# Patient Record
Sex: FEMALE | NOT HISPANIC OR LATINO | Employment: OTHER | URBAN - NONMETROPOLITAN AREA
[De-identification: names, ages, dates, MRNs, and addresses within clinical notes are randomized per-mention and may not be internally consistent; named-entity substitution may affect disease eponyms.]

---

## 2017-10-04 ENCOUNTER — OFFICE VISIT - GICH (OUTPATIENT)
Dept: INTERNAL MEDICINE | Facility: OTHER | Age: 77
End: 2017-10-04

## 2017-10-04 ENCOUNTER — HISTORY (OUTPATIENT)
Dept: INTERNAL MEDICINE | Facility: OTHER | Age: 77
End: 2017-10-04

## 2017-10-04 DIAGNOSIS — L65.9 NONSCARRING HAIR LOSS: ICD-10-CM

## 2017-10-04 DIAGNOSIS — Z23 ENCOUNTER FOR IMMUNIZATION: ICD-10-CM

## 2017-10-04 DIAGNOSIS — M85.80 OTHER SPECIFIED DISORDERS OF BONE DENSITY AND STRUCTURE, UNSPECIFIED SITE (CODE): ICD-10-CM

## 2017-10-04 DIAGNOSIS — E78.5 HYPERLIPIDEMIA: ICD-10-CM

## 2017-10-04 LAB
CHOL/HDL RATIO - HISTORICAL: 3.12
CHOLESTEROL TOTAL: 187 MG/DL
HDLC SERPL-MCNC: 60 MG/DL (ref 23–92)
LDLC SERPL CALC-MCNC: 109 MG/DL
NON-HDL CHOLESTEROL - HISTORICAL: 127 MG/DL
PROVIDER ORDERDED STATUS - HISTORICAL: ABNORMAL
TRIGL SERPL-MCNC: 90 MG/DL
TSH - HISTORICAL: 2.51 UIU/ML (ref 0.34–5.6)

## 2017-10-04 ASSESSMENT — ANXIETY QUESTIONNAIRES
2. NOT BEING ABLE TO STOP OR CONTROL WORRYING: NOT AT ALL
3. WORRYING TOO MUCH ABOUT DIFFERENT THINGS: NOT AT ALL
5. BEING SO RESTLESS THAT IT IS HARD TO SIT STILL: NOT AT ALL
4. TROUBLE RELAXING: NOT AT ALL
GAD7 TOTAL SCORE: 0
7. FEELING AFRAID AS IF SOMETHING AWFUL MIGHT HAPPEN: NOT AT ALL
6. BECOMING EASILY ANNOYED OR IRRITABLE: NOT AT ALL
1. FEELING NERVOUS, ANXIOUS, OR ON EDGE: NOT AT ALL

## 2017-10-04 ASSESSMENT — PATIENT HEALTH QUESTIONNAIRE - PHQ9: SUM OF ALL RESPONSES TO PHQ QUESTIONS 1-9: 0

## 2017-10-05 ENCOUNTER — AMBULATORY - GICH (OUTPATIENT)
Dept: INTERNAL MEDICINE | Facility: OTHER | Age: 77
End: 2017-10-05

## 2017-11-21 ENCOUNTER — COMMUNICATION - GICH (OUTPATIENT)
Dept: INTERNAL MEDICINE | Facility: OTHER | Age: 77
End: 2017-11-21

## 2017-12-18 ENCOUNTER — AMBULATORY - GICH (OUTPATIENT)
Dept: FAMILY MEDICINE | Facility: OTHER | Age: 77
End: 2017-12-18

## 2017-12-28 NOTE — PATIENT INSTRUCTIONS
Patient Information     Patient Name MRN Kiesha Kaye [id number] Female 1940      Patient Instructions by Riri Messer DO at 10/4/2017  9:00 AM     Author:  Riri Messer DO Service:  (none) Author Type:  PHYS- Osteopathic     Filed:  10/4/2017  9:37 AM Encounter Date:  10/4/2017 Status:  Signed     :  Riri Messer DO (PHYS- Osteopathic)            Recommend taking Vitamin D 1000 IU daily.    Also, Calcium 1200-1500mg daily from supplement and diet combined.  Be sure to take into account the amount of calcium you may be getting from food daily so as not to get too much calcium.    Recommend daily exercise with a focus on low-weight strengthening.  Yoga is a great way to do this.    Please let me know if you have any questions regarding this.       Index South Korean Related topics   Calcium in the Diet   ________________________________________________________________________  KEY POINTS    Calcium is a mineral that is important for your heart, bones, teeth, and muscles to stay healthy.    Eating or drinking certain things can cause you to lose calcium.    You can get calcium from dairy products, calcium-fortified foods, or from supplements. If you can get enough calcium in your diet, you do not need to take calcium supplements.  ________________________________________________________________________  What is calcium?   Calcium is a mineral that is very important for:    Heart health    Bone health    Teeth    Nerves    Muscles    Blood clotting    How much calcium do I need?   Many food products list the amount of calcium per serving on the label. Food labels list calcium as a % of the Daily Value (DV) based on 1,000 mg of calcium per day. Look for foods that provide 10% or more of the daily value for calcium.   The total amount of calcium you need, preferably from dairy foods, depends on your age and gender:      Group            Calcium/Day  --------------------------------------  Adults 19 to 50  1000 mg  Women 51 to 70   1200 mg  Men 51 to 70     1000 mg  Adults over 70   1200 mg  --------------------------------------  * mg = milligrams    What keeps me from getting enough calcium?   Here are some things that can make it harder for your body to get enough calcium:    Not getting enough vitamin D. Vitamin D increases the amount of calcium absorbed by your body. It s important to get enough sunlight to help your body make vitamin D and to choose foods that contain vitamin D. Milk contains vitamin D and some brands of cheese, yogurt, juice, and margarine have added vitamin D. Check labels for the amount of vitamin D per serving. Fish such as salmon, mackerel, and tuna are good natural sources of vitamin D. If your provider recommends that you take a calcium supplement, there are some that include vitamin D.    Too much fiber in the diet. This is more of a concern if you have low amounts of calcium in your diet. Take calcium supplements or eat calcium-fortified foods 2 hours before or after eating 100% bran products. Soaking beans in water and discarding the liquid before cooking can also help.    Soft drinks, energy drinks, tea, and coffee. People who drink these products instead of milk often don't get enough calcium.    Taking some medicines. Medicines such as some antibiotics, heartburn medicines that decrease stomach acid production, and antacids that contain aluminum can make it harder for your body to absorb calcium.  These things can cause you to lose calcium:    Eating a lot of protein foods, such as meats, poultry, and eggs. The more protein you eat, the more calcium you lose. As long as your diet is balanced and contains enough calcium, this should not be a problem.    Eating a lot of salt. The more salt in your diet, the more calcium you lose. Limit the salt in your diet. Cutting back on salt and getting enough calcium can help  lower blood pressure and help prevent fluid retention.  Milk products are one of the best sources of calcium. Calcium is also in many other foods such as vegetables, beans, nuts, seeds, and soy. However, the calcium in these foods is not absorbed as well as the calcium in milk products. Calcium has been added to some foods (fortified), which makes it easier to meet daily calcium needs, but it still can be hard for your body to get enough calcium if dairy foods are not a part of your diet.     Do I need a calcium supplement?   If you can get enough calcium in your diet, you do not need to take calcium supplements. People who get too much calcium have a higher risk for kidney stones and stroke. Men who take calcium supplements are at higher risk for a heart attack. Ask your healthcare provider if you should take calcium supplements, and which kind you should take.   Calcium supplements of 1000 mg or less per day do not prevent fractures in postmenopausal women. However, there may be some benefit from higher doses of calcium supplements. If you are a postmenopausal woman and you have never had a fracture, ask your healthcare provider if you should take calcium supplements.  You may need a supplement if you:    Have digestive problems that prevent you from absorbing calcium.    Avoid dairy products due to allergic or other reactions (such as lactose intolerance or milk allergy).    Don't eat any animal products.    Do not get enough calcium in your diet.    Are pregnant or breast-feeding.    Have osteoporosis or osteopenia (weakened bones).    Have a vitamin D deficiency.  There are many kinds of calcium supplements. The most common are calcium carbonate and calcium citrate.     Calcium carbonate is best absorbed with a meal.    Calcium citrate can be taken on a full or empty stomach. Calcium citrate may be a better choice for older adults or younger people who have low levels of stomach acid.    Calcium phosphate,  lactate, and gluconate are also well absorbed. However, the amount of calcium per pill is lower, so you may need to take many pills a day to meet your needs.  Your body absorbs calcium best if you take no more than 500 mg at a time, and take it two or more times per day as recommended by your healthcare provider. Look for calcium supplements that have the USP or Consumer Lab symbol on the label. Products with these labels have been tested to make sure they are absorbed by the body.    How can I eat the right amount of calcium?  Eat more calcium-rich foods. Here are some ideas for adding calcium to your diet.    Have low-fat or nonfat milk, cottage cheese with fruit, or yogurt for snacks.    Eat calcium-fortified breakfast cereals with rice, almond, or soy milk, or have calcium-fortified waffles or pancakes.    Cook hot cereals with milk instead of water.    Serve yogurt or milk smoothies instead of juice.    Add yogurt to lunches or use a dip made with yogurt when having a fruit snack.    Add lean shredded cheese to baked potatoes, vegetables, soups, and salads.    Use milk when making cream soups instead of water.    Serve flavored milk or hot chocolate for an evening treat.    Use Parmesan cheese topping for Italian dishes. A 2 tablespoon serving adds about 140 mg of calcium.    Serve a healthy vegetable pizza made with low-fat cheese.    Serve lean mozzarella string cheese with crackers and fruit for a snack.    Make puddings with milk.    Get plenty of exercise. Walk a mile a day if you can and do strength training exercise a few times a week. Exercise helps your body to use calcium to strengthen your bones.  Some people cannot digest milk products because their bodies lack the enzyme needed to break down milk sugar. This problem is called lactose intolerance. If you have this problem, you can buy products such as Lactaid or Dairy Ease. These products contain lactase, which can help you digest milk  products.    For more information contact:    The Academy of Nutrition and Dietetics  284.991.9955  http://www.eatright.org  Developed by [employer].  Adult Advisor 2016.3 published by [employer].  Last modified: 2015-04-17  Last reviewed: 2015-03-25  This content is reviewed periodically and is subject to change as new health information becomes available. The information is intended to inform and educate and is not a replacement for medical evaluation, advice, diagnosis or treatment by a healthcare professional.  References   Adult Advisor 2016.3 Index    Copyright   2016 [employer], a division of [employer] All rights reserved.

## 2017-12-30 NOTE — NURSING NOTE
Patient Information     Patient Name MRN Kiesha Kaye [id number] Female 1940      Nursing Note by Amrita Canseco at 10/4/2017  9:00 AM     Author:  Amrita Canseco Service:  (none) Author Type:  (none)     Filed:  10/4/2017  9:17 AM Encounter Date:  10/4/2017 Status:  Signed     :  Amrita Canseco            Patient here for annual exam, needs lab work, declined flu vaccine today. Last eye exam  and last dental exam . Amrita Canseco LPN .......................10/4/2017  9:10 AM

## 2018-01-25 ENCOUNTER — DOCUMENTATION ONLY (OUTPATIENT)
Dept: FAMILY MEDICINE | Facility: OTHER | Age: 78
End: 2018-01-25

## 2018-01-25 PROBLEM — J30.2 SEASONAL ALLERGIES: Status: ACTIVE | Noted: 2018-01-25

## 2018-01-25 RX ORDER — DIPHENHYDRAMINE HYDROCHLORIDE 25 MG/1
5 TABLET ORAL DAILY
COMMUNITY
Start: 2015-10-23

## 2018-01-25 RX ORDER — DIPHENOXYLATE HYDROCHLORIDE AND ATROPINE SULFATE 2.5; .025 MG/1; MG/1
1 TABLET ORAL DAILY
COMMUNITY

## 2018-01-26 VITALS
HEART RATE: 68 BPM | WEIGHT: 119.8 LBS | DIASTOLIC BLOOD PRESSURE: 64 MMHG | HEIGHT: 62 IN | SYSTOLIC BLOOD PRESSURE: 124 MMHG | BODY MASS INDEX: 22.05 KG/M2

## 2018-02-01 ASSESSMENT — ANXIETY QUESTIONNAIRES: GAD7 TOTAL SCORE: 0

## 2018-02-01 ASSESSMENT — PATIENT HEALTH QUESTIONNAIRE - PHQ9: SUM OF ALL RESPONSES TO PHQ QUESTIONS 1-9: 0

## 2018-06-26 ENCOUNTER — HOSPITAL ENCOUNTER (EMERGENCY)
Facility: OTHER | Age: 78
Discharge: HOME | End: 2018-06-26
Attending: EMERGENCY MEDICINE | Admitting: EMERGENCY MEDICINE
Payer: MEDICARE

## 2018-06-26 VITALS
TEMPERATURE: 97.6 F | WEIGHT: 119 LBS | DIASTOLIC BLOOD PRESSURE: 88 MMHG | SYSTOLIC BLOOD PRESSURE: 165 MMHG | BODY MASS INDEX: 20.32 KG/M2 | HEART RATE: 75 BPM | RESPIRATION RATE: 16 BRPM | HEIGHT: 64 IN | OXYGEN SATURATION: 96 %

## 2018-06-26 DIAGNOSIS — R44.0 AUDITORY HALLUCINATIONS: ICD-10-CM

## 2018-06-26 LAB

## 2018-06-26 PROCEDURE — 81001 URINALYSIS AUTO W/SCOPE: CPT | Performed by: EMERGENCY MEDICINE

## 2018-06-26 PROCEDURE — 99282 EMERGENCY DEPT VISIT SF MDM: CPT | Mod: Z6 | Performed by: EMERGENCY MEDICINE

## 2018-06-26 PROCEDURE — 99283 EMERGENCY DEPT VISIT LOW MDM: CPT | Performed by: EMERGENCY MEDICINE

## 2018-06-26 ASSESSMENT — ENCOUNTER SYMPTOMS
HALLUCINATIONS: 1
FATIGUE: 0
WEAKNESS: 0
DIZZINESS: 0
LIGHT-HEADEDNESS: 0
ABDOMINAL PAIN: 0
CHILLS: 0
HEADACHES: 0
SHORTNESS OF BREATH: 0
FEVER: 0
PALPITATIONS: 0

## 2018-06-26 NOTE — ED AVS SNAPSHOT
[facility]    [address]    [place] [address]    Phone:  [phone]    Fax:  [phone]                                       Kiesha Garner   MRN: [id number]    Department:  [facility]   Date of Visit:  6/26/2018           After Visit Summary Signature Page     I have received my discharge instructions, and my questions have been answered. I have discussed any challenges I see with this plan with the nurse or doctor.    ..........................................................................................................................................  Patient/Patient Representative Signature      ..........................................................................................................................................  Patient Representative Print Name and Relationship to Patient    ..................................................               ................................................  Date                                            Time    ..........................................................................................................................................  Reviewed by Signature/Title    ...................................................              ..............................................  Date                                                            Time

## 2018-06-26 NOTE — ED AVS SNAPSHOT
[facility]    [address]    [place] [address]    Phone:  [phone]    Fax:  [phone]                                       Kiesha Garner   MRN: [id number]    Department:  [facility]   Date of Visit:  6/26/2018           Patient Information     Date Of Birth          1940        Your diagnoses for this visit were:     Auditory hallucinations        You were seen by Uriah Harper MD.        Discharge Instructions       1.  Low up with your primary care physician tomorrow for further evaluation and recommendations  2.  Return to ER as needed    24 Hour Appointment Hotline     To schedule an appointment at [facility], please call [phone]. If you don't have a family doctor or clinic, we will help you find one. [facility] are conveniently located to serve the needs of you and your family.           Review of your medicines      Our records show that you are taking the medicines listed below. If these are incorrect, please call your family doctor or clinic.        Dose / Directions Last dose taken    BIOTIN 5000 5 MG Caps   Dose:  5 mg   Generic drug:  biotin        Take 5 mg by mouth daily   Refills:  0        calcium 500/D 500-200 MG-UNIT per tablet   Dose:  1-2 tablet   Generic drug:  calcium carb 1250 mg (500 mg [place])/vitamin D 200 unit        Take 1-2 tablets by mouth daily   Refills:  0        MULTI-VITAMINS Tabs   Dose:  1 tablet        Take 1 tablet by mouth daily   Refills:  0        PSYLLIUM PO   Dose:  1 tsp.        Take 1 tsp. by mouth daily   Refills:  0                Procedures and tests performed during your visit     *UA reflex to Microscopic    Urine Microscopic      Orders Needing Specimen Collection     None      Pending Results     No orders found from 6/24/2018 to 6/27/2018.            Pending Culture Results     No orders found from 6/24/2018 to 6/27/2018.            Pending Results Instructions     If you  had any lab results that were not finalized at the time of your Discharge, you can call the ED Lab Result RN at [phone]. You will be contacted by this team for any positive Lab results or changes in treatment. The nurses are available 7 days a week from 10A to 6:30P.  You can leave a message 24 hours per day and they will return your call.        Thank you for choosing [facility]       Thank you for choosing [facility] for your care. Our goal is always to provide you with excellent care. Hearing back from our patients is one way we can continue to improve our services. Please take a few minutes to complete the written survey that you may receive in the mail after you visit with us. Thank you!        [employer] Information     [employer] gives you secure access to your electronic health record. If you see a primary care provider, you can also send messages to your care team and make appointments. If you have questions, please call your primary care clinic.  If you do not have a primary care provider, please call [phone] and they will assist you.        Care EveryWhere ID     This is your Care EveryWhere ID. This could be used by other organizations to access your [facility] medical records  [id number]        Equal Access to Services     CHAPITO DA SILVA : Hadii xiomara Galeana, zulma archer, thom carpio, keri toussaint. So [facility] [phone].    ATENCIÓN: Si habla español, tiene a kruger disposición servicios gratuitos de asistencia lingüística. Llame al [phone].    We comply with applicable federal civil rights laws and [place] laws. We do not discriminate on the basis of race, color, national origin, age, disability, sex, sexual orientation, or gender identity.            After Visit Summary       This is your record. Keep this with you and show to your community pharmacist(s) and doctor(s) at your next visit.

## 2018-06-27 ENCOUNTER — TELEPHONE (OUTPATIENT)
Dept: INTERNAL MEDICINE | Facility: OTHER | Age: 78
End: 2018-06-27

## 2018-06-27 NOTE — TELEPHONE ENCOUNTER
I am okay with seeing her tomorrow during Neptali's appointment especially if his blood pressure is controlled.  We can reschedule him in the next week or 2 if needed.

## 2018-06-27 NOTE — TELEPHONE ENCOUNTER
Patient's  Neptali states his blood pressure has been good.  He will bring in past readings tomorrow.  Patient will be seen at 10:20 tomorrow, Thursday 06/28/18 for ER follow up.    Shea Marroquin LPN............6/27/2018 1:46 PM

## 2018-06-27 NOTE — ED PROVIDER NOTES
"  History   No chief complaint on file.    HPI Comments: This is a 78-year-old female who denies any significant past medical history or psychiatric history coming into the emergency room with complaint of hearing voices for about a week now.  Patient states she has been hearing voices regarding her son in distress.  She denies ever having had the same experience in the past or being treated for any psychiatric history in the past.  She admits that she is under lots of stress as her 's both is not well and she also had a friend who recently passed away.  She discuss this with her son and was told to come into the emergency room \"to rule out stroke.\"  Patient denies visual changes, headache, focal weakness, or voice changes or slurring of the speech.  She denies prior history of stroke or TIA.       Problem List:    Patient Active Problem List    Diagnosis Date Noted     Seasonal allergies 01/25/2018     Priority: Medium     Health care maintenance 10/24/2015     Priority: Medium     Overview:   Colonoscopy: Done in 2006 and normal, repeat scheduled  Breast Exam/Mammography: Does not complete self exams; last mammogram 2014 and normal, repeat every 2 years (this year)  Pap smear: Never any abnormal, no longer performing due to hysterectomy, age and patient request unless symptomatic  DEXA: Done in 2015 and T score - 1.2; repeat 5 years and as needed to manage your bone density problem  Immunizations: Due for influenza now, pneumovax in Oct, declined influenza.  Appt for pneumovax. UTD on other vaccines.   Lipids/Annual Exam: Done a year ago and elevated, repeat this fall  Hepatitis C screen: not indicated       ACP (advance care planning) 01/02/2014     Priority: Medium     Hyperlipidemia 10/24/2012     Priority: Medium     Overview:   diet controlled       Osteopenia 04/10/2012     Priority: Medium     Overview:   she reports prior dexa that showed low density          Past Medical History:    Past Medical " History:   Diagnosis Date     Cataract      Duodenal ulcer without hemorrhage or perforation      Hyperlipidemia      Other seasonal allergic rhinitis      Other specified disorders of bone density and structure, unspecified site (CODE)      Palpitations      Personal history of other specified conditions (CODE)        Past Surgical History:    Past Surgical History:   Procedure Laterality Date     COLONOSCOPY      9/30/2016,normal; repeat 10 years if indicated     EXTRACAPSULAR CATARACT EXTRATION WITH INTRAOCULAR LENS IMPLANT      No Comments Provided     HYSTERECTOMY TOTAL ABDOMINAL      1984,Dr. Donnelly; for fibroids       Family History:    Family History   Problem Relation Age of Onset     Other - See Comments Father      Pneumonia and alcohol related illness     Other - See Comments Mother      Memory loss     Other - See Comments Sister      Takatsubo cardiomyopathy     Dementia Sister      Dementia     Colon Cancer Paternal Aunt      Cancer-colon     Other - See Comments Son      No Known Problems     Genetic Disorder Other      Genetic,No Fhx of DM, CAD     Breast Cancer No family hx of      Cancer-breast       Social History:  Marital Status:   [2]  Social History   Substance Use Topics     Smoking status: Passive Smoke Exposure - Never Smoker     Smokeless tobacco: Never Used     Alcohol use 0.0 oz/week      Comment: Alcoholic Drinks/day: socially; likes red wine        Medications:      biotin (BIOTIN 5000) 5 MG CAPS   calcium carb 1250 mg, 500 mg [place],/vitamin D 200 unit (CALCIUM 500/D) 500-200 MG-UNIT per tablet   Multiple Vitamin (MULTI-VITAMINS) TABS   PSYLLIUM PO         Review of Systems   Constitutional: Negative for chills, fatigue and fever.   Respiratory: Negative for shortness of breath.    Cardiovascular: Negative for chest pain and palpitations.   Gastrointestinal: Negative for abdominal pain.   Neurological: Negative for dizziness, weakness, light-headedness and headaches.  "  Psychiatric/Behavioral: Positive for hallucinations.   All other systems reviewed and are negative.      Physical Exam   BP: 165/88  Pulse: 75  Temp: 97.6  F (36.4  C)  Resp: 16  Height: 162.6 cm (5' 4\")  Weight: 54 kg (119 lb)  SpO2: 96 %      Physical Exam   Constitutional: She is oriented to person, place, and time. She appears well-developed and well-nourished. No distress.   HENT:   Head: Normocephalic and atraumatic.   Mouth/Throat: Oropharynx is clear and moist. No oropharyngeal exudate.   Eyes: Conjunctivae and EOM are normal. Pupils are equal, round, and reactive to light.   Vision grossly intact.   Neck: Normal range of motion.   Cardiovascular: Normal rate, regular rhythm, normal heart sounds and intact distal pulses.    Pulmonary/Chest: Effort normal and breath sounds normal. No respiratory distress. She has no wheezes. She has no rales. She exhibits no tenderness.   Abdominal: Soft. Bowel sounds are normal. She exhibits no distension. There is no tenderness. There is no rebound and no guarding.   Musculoskeletal: Normal range of motion. She exhibits no edema or tenderness.   Neurological: She is oriented to person, place, and time.   No focal neurologic findings on exam and   Skin: No rash noted. No erythema. No pallor.   Psychiatric: She has a normal mood and affect. Her speech is normal and behavior is normal. Judgment and thought content normal. Cognition and memory are normal.       ED Course     Patient is having fixated auditory hallucinations whereby she is hearing voices regarding her son who is stress for about a week.  She denies any psychiatric history in the past.  However she says she has been under stress lately regarding her 's health and having a friend who recently passed away.  She does not appear to be, however, distressed during examination.  Patient has no focal neurologic findings or visual disturbances.  UA obtained per ED nurse discretion is completely unremarkable.  " Patient is reassured hearing voices is not signs or symptom of stroke.  On examination or per history she has no signs or symptoms of stroke.  Patient was apparently told by her son who spoke with her chiropractor that patient should come to the emergency room and get blood work and and get stroke ruled out.  I told the patient based on her examination and history I do not believe that she has to worry about stroke for this.  She is advised to consult with her primary care physician tomorrow regarding these recurrent auditory hallucinations.  She said this is what she was planning to do anyway.     ED Course     Procedures               Critical Care time:  none               Results for orders placed or performed during the hospital encounter of 06/26/18 (from the past 24 hour(s))   *UA reflex to Microscopic   Result Value Ref Range    Color Urine Yellow     Appearance Urine Clear     Glucose Urine Negative NEG^Negative mg/dL    Bilirubin Urine Negative NEG^Negative    Ketones Urine Negative NEG^Negative mg/dL    Specific Gravity Urine 1.015 1.003 - 1.035    Blood Urine Negative NEG^Negative    pH Urine 6.0 5.0 - 7.0 pH    Protein Albumin Urine Negative NEG^Negative mg/dL    Urobilinogen Urine 0.2 0.2 - 1.0 EU/dL    Nitrite Urine Negative NEG^Negative    Leukocyte Esterase Urine Small (A) NEG^Negative    Source Midstream Urine    Urine Microscopic   Result Value Ref Range    WBC Urine 0 - 5 OTO5^0 - 5 /HPF    RBC Urine O - 2 OTO2^O - 2 /HPF    Squamous Epithelial /LPF Urine Few FEW^Few /LPF    Bacteria Urine Few (A) NEG^Negative /HPF       Medications - No data to display    Assessments & Plan (with Medical Decision Making)     I have reviewed the nursing notes.    I have reviewed the findings, diagnosis, plan and need for follow up with the patient.       Discharge Medication List as of 6/26/2018 10:12 PM          Final diagnoses:   Auditory hallucinations       6/26/2018   [facility]      Uriah Harper MD  06/26/18 [id number]

## 2018-06-27 NOTE — TELEPHONE ENCOUNTER
Grand daughter Pallavi states patient is hallucinating hearing Neptali's voice saying things.  Police were called yesterday.   This has been going on for the past week.  Inquiring about changing Neptali Garner appointment tomorrow for Kiesha to be seen at that time.  Friday at 11:40 for ER follow up will work for family but they are concerned.  Please advise.    Shea Marroquin LPN............6/27/2018 11:14 AM

## 2018-06-27 NOTE — DISCHARGE INSTRUCTIONS
1.  Low up with your primary care physician tomorrow for further evaluation and recommendations  2.  Return to ER as needed

## 2018-06-27 NOTE — ED TRIAGE NOTES
"Pt comes in with Granddaughter. Pt has been having intermittent voices in her head for about a week. Pt reports it is her son's voice, the voices aren't telling her to do anything, they are not malicious. Pt's son is alive. Pt reports \"humming\" in her ear. Pt has no chronic diseases and her medications are vitamins. Pt denies taking any other medications. Pt is not suicidal. Family thinks she needs to have some blood work and a UA.    Cynthia Allen RN on 6/26/2018 at 9:22 PM    "

## 2018-06-28 ENCOUNTER — OFFICE VISIT (OUTPATIENT)
Dept: INTERNAL MEDICINE | Facility: OTHER | Age: 78
End: 2018-06-28
Attending: INTERNAL MEDICINE
Payer: MEDICARE

## 2018-06-28 VITALS
HEIGHT: 64 IN | SYSTOLIC BLOOD PRESSURE: 136 MMHG | RESPIRATION RATE: 18 BRPM | TEMPERATURE: 97 F | WEIGHT: 120.56 LBS | BODY MASS INDEX: 20.58 KG/M2 | HEART RATE: 72 BPM | DIASTOLIC BLOOD PRESSURE: 82 MMHG

## 2018-06-28 DIAGNOSIS — E78.5 HYPERLIPIDEMIA, UNSPECIFIED HYPERLIPIDEMIA TYPE: ICD-10-CM

## 2018-06-28 DIAGNOSIS — E87.1 HYPONATREMIA: ICD-10-CM

## 2018-06-28 DIAGNOSIS — R44.0 AUDITORY HALLUCINATIONS: Primary | ICD-10-CM

## 2018-06-28 DIAGNOSIS — R42 DIZZINESS: ICD-10-CM

## 2018-06-28 LAB
ALBUMIN SERPL-MCNC: 4.8 G/DL (ref 3.5–5.7)
ALP SERPL-CCNC: 44 U/L (ref 34–104)
ALT SERPL W P-5'-P-CCNC: 18 U/L (ref 7–52)
ANION GAP SERPL CALCULATED.3IONS-SCNC: 6 MMOL/L (ref 3–14)
AST SERPL W P-5'-P-CCNC: 21 U/L (ref 13–39)
BASOPHILS # BLD AUTO: 0 10E9/L (ref 0–0.2)
BASOPHILS NFR BLD AUTO: 0.4 %
BILIRUB SERPL-MCNC: 0.8 MG/DL (ref 0.3–1)
BUN SERPL-MCNC: 9 MG/DL (ref 7–25)
CALCIUM SERPL-MCNC: 9.8 MG/DL (ref 8.6–10.3)
CHLORIDE SERPL-SCNC: 95 MMOL/L (ref 98–107)
CHOLEST SERPL-MCNC: 183 MG/DL
CO2 SERPL-SCNC: 29 MMOL/L (ref 21–31)
CREAT SERPL-MCNC: 0.69 MG/DL (ref 0.6–1.2)
CRP SERPL-MCNC: 0 MG/L
DIFFERENTIAL METHOD BLD: NORMAL
EOSINOPHIL # BLD AUTO: 0.1 10E9/L (ref 0–0.7)
EOSINOPHIL NFR BLD AUTO: 1.5 %
ERYTHROCYTE [DISTWIDTH] IN BLOOD BY AUTOMATED COUNT: 13.5 % (ref 10–15)
ERYTHROCYTE [SEDIMENTATION RATE] IN BLOOD BY WESTERGREN METHOD: 2 MM/H (ref 1–15)
GFR SERPL CREATININE-BSD FRML MDRD: 82 ML/MIN/1.7M2
GLUCOSE SERPL-MCNC: 98 MG/DL (ref 70–105)
HCT VFR BLD AUTO: 42.2 % (ref 35–47)
HDLC SERPL-MCNC: 68 MG/DL (ref 23–92)
HGB BLD-MCNC: 13.8 G/DL (ref 11.7–15.7)
IMM GRANULOCYTES # BLD: 0 10E9/L (ref 0–0.4)
IMM GRANULOCYTES NFR BLD: 0.6 %
LDLC SERPL CALC-MCNC: 102 MG/DL
LYMPHOCYTES # BLD AUTO: 1.8 10E9/L (ref 0.8–5.3)
LYMPHOCYTES NFR BLD AUTO: 25.8 %
MCH RBC QN AUTO: 28 PG (ref 26.5–33)
MCHC RBC AUTO-ENTMCNC: 32.7 G/DL (ref 31.5–36.5)
MCV RBC AUTO: 86 FL (ref 78–100)
MONOCYTES # BLD AUTO: 0.6 10E9/L (ref 0–1.3)
MONOCYTES NFR BLD AUTO: 8 %
NEUTROPHILS # BLD AUTO: 4.4 10E9/L (ref 1.6–8.3)
NEUTROPHILS NFR BLD AUTO: 63.7 %
NONHDLC SERPL-MCNC: 115 MG/DL
PLATELET # BLD AUTO: 251 10E9/L (ref 150–450)
POTASSIUM SERPL-SCNC: 4.8 MMOL/L (ref 3.5–5.1)
PROT SERPL-MCNC: 7.1 G/DL (ref 6.4–8.9)
RBC # BLD AUTO: 4.93 10E12/L (ref 3.8–5.2)
SODIUM SERPL-SCNC: 130 MMOL/L (ref 134–144)
TRIGL SERPL-MCNC: 66 MG/DL
TSH SERPL DL<=0.05 MIU/L-ACNC: 2.13 IU/ML (ref 0.34–5.6)
WBC # BLD AUTO: 6.9 10E9/L (ref 4–11)

## 2018-06-28 PROCEDURE — G0463 HOSPITAL OUTPT CLINIC VISIT: HCPCS | Performed by: INTERNAL MEDICINE

## 2018-06-28 PROCEDURE — 85025 COMPLETE CBC W/AUTO DIFF WBC: CPT | Performed by: INTERNAL MEDICINE

## 2018-06-28 PROCEDURE — 86038 ANTINUCLEAR ANTIBODIES: CPT | Performed by: INTERNAL MEDICINE

## 2018-06-28 PROCEDURE — 85652 RBC SED RATE AUTOMATED: CPT | Performed by: INTERNAL MEDICINE

## 2018-06-28 PROCEDURE — 86140 C-REACTIVE PROTEIN: CPT | Performed by: INTERNAL MEDICINE

## 2018-06-28 PROCEDURE — 80061 LIPID PANEL: CPT | Performed by: INTERNAL MEDICINE

## 2018-06-28 PROCEDURE — 80053 COMPREHEN METABOLIC PANEL: CPT | Performed by: INTERNAL MEDICINE

## 2018-06-28 PROCEDURE — 86039 ANTINUCLEAR ANTIBODIES (ANA): CPT | Performed by: INTERNAL MEDICINE

## 2018-06-28 PROCEDURE — 86618 LYME DISEASE ANTIBODY: CPT | Performed by: INTERNAL MEDICINE

## 2018-06-28 PROCEDURE — 84443 ASSAY THYROID STIM HORMONE: CPT | Performed by: INTERNAL MEDICINE

## 2018-06-28 PROCEDURE — 99214 OFFICE O/P EST MOD 30 MIN: CPT | Performed by: INTERNAL MEDICINE

## 2018-06-28 PROCEDURE — 36415 COLL VENOUS BLD VENIPUNCTURE: CPT | Performed by: INTERNAL MEDICINE

## 2018-06-28 ASSESSMENT — PAIN SCALES - GENERAL: PAINLEVEL: NO PAIN (0)

## 2018-06-28 ASSESSMENT — ANXIETY QUESTIONNAIRES
7. FEELING AFRAID AS IF SOMETHING AWFUL MIGHT HAPPEN: NOT AT ALL
GAD7 TOTAL SCORE: 8
1. FEELING NERVOUS, ANXIOUS, OR ON EDGE: SEVERAL DAYS
5. BEING SO RESTLESS THAT IT IS HARD TO SIT STILL: NOT AT ALL
2. NOT BEING ABLE TO STOP OR CONTROL WORRYING: MORE THAN HALF THE DAYS
6. BECOMING EASILY ANNOYED OR IRRITABLE: NEARLY EVERY DAY
IF YOU CHECKED OFF ANY PROBLEMS ON THIS QUESTIONNAIRE, HOW DIFFICULT HAVE THESE PROBLEMS MADE IT FOR YOU TO DO YOUR WORK, TAKE CARE OF THINGS AT HOME, OR GET ALONG WITH OTHER PEOPLE: NOT DIFFICULT AT ALL
3. WORRYING TOO MUCH ABOUT DIFFERENT THINGS: SEVERAL DAYS

## 2018-06-28 ASSESSMENT — PATIENT HEALTH QUESTIONNAIRE - PHQ9: 5. POOR APPETITE OR OVEREATING: SEVERAL DAYS

## 2018-06-28 NOTE — MR AVS SNAPSHOT
After Visit Summary   6/28/2018    Kiesha Garner    MRN: [id number]           Patient Information     Date Of Birth          1940        Visit Information        Provider Department      6/28/2018 10:20 AM Riri Messer DO [facility]        Today's Diagnoses     Auditory hallucinations    -  1    Hyperlipidemia, unspecified hyperlipidemia type           Follow-ups after your visit        Follow-up notes from your care team     Return in about 1 week (around 7/5/2018) for Recheck, after testing.      Your next 10 appointments already scheduled     Jul 06, 2018  3:40 PM CDT   SHORT with Riri Messer DO   [facility] ([facility])    [address]  [place] [address] [phone]            Aug 27, 2018  9:00 AM CDT   PHYSICAL with Riri Messer DO   [facility] ([facility])    [address]  [place] [address]   [phone]              Future tests that were ordered for you today     Open Future Orders        Priority Expected Expires Ordered    MR Brain w/o & w Contrast Routine  6/28/2019 6/28/2018            Who to contact     If you have questions or need follow up information about today's clinic visit or your schedule please contact [facility] directly at [phone].  Normal or non-critical lab and imaging results will be communicated to you by MyChart, letter or phone within 4 business days after the clinic has received the results. If you do not hear from us within 7 days, please contact the clinic through MyChart or phone. If you have a critical or abnormal lab result, we will notify you by phone as soon as possible.  Submit refill requests through [employer] World or call your pharmacy and they will forward the refill request to us. Please allow 3 business days for your refill to be completed.          Additional Information About  "Your Visit        MyChart Information     [employer] gives you secure access to your electronic health record. If you see a primary care provider, you can also send messages to your care team and make appointments. If you have questions, please call your primary care clinic.  If you do not have a primary care provider, please call [phone] and they will assist you.        Care EveryWhere ID     This is your Care EveryWhere ID. This could be used by other organizations to access your [place] medical records  [id number]        Your Vitals Were     Pulse Temperature Respirations Height Breastfeeding? BMI (Body Mass Index)    72 97  F (36.1  C) (Tympanic) 18 1.626 m (5' 4\") No 20.69 kg/m2       Blood Pressure from Last 3 Encounters:   06/28/18 136/82   06/26/18 165/88   10/04/17 124/64    Weight from Last 3 Encounters:   06/28/18 54.7 kg (120 lb 9 oz)   06/26/18 54 kg (119 lb)   10/04/17 54.3 kg (119 lb 12.8 oz)              We Performed the Following     Anti Nuclear Amanda IgG by IFA with Reflex     CBC and Differential     Comprehensive metabolic panel     CRP inflammation     Lipid Panel     Lyme Disease Amanda with reflex to WB Serum     Sedimentation Rate (ESR)     TSH        Primary Care Provider Office Phone # Fax #    Riri MURRAY DO [facility] [phone] [address] [facility] [address]        Equal Access to Services     [facility] : Hadii aad ku hadasho Soomaali, waaxda luqadaha, qaybta kaalmada adeegyada, keri meredith . So [facility] [phone].    ATENCIÓN: Si habla español, tiene a kruger disposición servicios gratuitos de asistencia lingüística. Llame al [phone].    We comply with applicable federal civil rights laws and [place] laws. We do not discriminate on the basis of race, color, national origin, age, disability, sex, sexual orientation, or gender identity.            Thank you!     Thank you for choosing [facility] AND [facility]  for your " care. Our goal is always to provide you with excellent care. Hearing back from our patients is one way we can continue to improve our services. Please take a few minutes to complete the written survey that you may receive in the mail after your visit with us. Thank you!             Your Updated Medication List - Protect others around you: Learn how to safely use, store and throw away your medicines at www.disposemymeds.org.          This list is accurate as of 6/28/18 11:32 AM.  Always use your most recent med list.                   Brand Name Dispense Instructions for use Diagnosis    BIOTIN 5000 5 MG Caps   Generic drug:  biotin      Take 5 mg by mouth daily        calcium 500/D 500-200 MG-UNIT per tablet   Generic drug:  calcium carb 1250 mg (500 mg [place])/vitamin D 200 unit      Take 1-2 tablets by mouth daily        MULTI-VITAMINS Tabs      Take 1 tablet by mouth daily        PSYLLIUM PO      Take 1 tsp. by mouth daily

## 2018-06-28 NOTE — NURSING NOTE
Patient presents to clinic with her granddaughter Pallavi and  Neptali, for follow up after ER visit on 06/26/18 experiencing hallucinations.  She denies any pain and is lightheaded.    Shea Marroquin LPN............6/28/2018 10:34 AM

## 2018-06-28 NOTE — PROGRESS NOTES
"Chief Complaint   Patient presents with     Follow Up For     ER visit 06/26/18 hallucinations         HPI: Ms. Garner is a 78 year old female who presents today for follow up of recent ER visit.  She was seen in the ER on June 26, 2018.  At that time she was complaining of new onset auditory hallucinations.  She reports that this started approximately 2 weeks ago.  She started hearing voices in the tone of her son's voice.  It primarily occurs at night.  At times the voice is a conversational but at other times it is distressed.  He has not had any visual changes or visual hallucinations with this.  She has had some dizziness/lightheadedness off and on and also has noted a fine tremor in her right hand.  She denies any recent illness over the last 2 months.  She denies any sick contacts.  She has not had any recent travel.  She does not have any history of mental illness.  She has not had any headaches, muscle weakness, changes in sensation or coordination issues.  She reports some \"swishing\" in her ears more chronically over the last couple weeks.  She feels perhaps it is slightly worse on the left than the right.  She has never had anything like this prior.    She does have a history of hyperlipidemia.  She is due for cholesterol recheck.  She is not currently on any medications.  She is fasting today.    Her current medications include biotin, calcium and vitamin D, multivitamin and fiber.    She  reports that she is a non-smoker but has been exposed to tobacco smoke. She has never used smokeless tobacco.    Past medical history reviewed as below:     Past Medical History:   Diagnosis Date     Cataract      Duodenal ulcer without hemorrhage or perforation 1999    due to H pylori     Hyperlipidemia 10/24/2012    diet controlled     Osteopenia 04/10/2012     Other seasonal allergic rhinitis      Palpitations     only with increased alcohol   .      ROS  Pertinent ROS was performed and was negative, including for " "weight changes, night sweats, fever, chills, chest pain, shortness of breath, increased lower extremity edema, changes in bowel or bladder, blood in the stool, difficulty swallowing, sores in the mouth.  She has not had any difficulty walking.  No other concerns, with exception of HPI above.      EXAM:   /82 (BP Location: Right arm, Patient Position: Sitting, Cuff Size: Adult Regular)  Pulse 72  Temp 97  F (36.1  C) (Tympanic)  Resp 18  Ht 5' 4\" (1.626 m)  Wt 120 lb 9 oz (54.7 kg)  Breastfeeding? No  BMI 20.69 kg/m2    Estimated body mass index is 20.69 kg/(m^2) as calculated from the following:    Height as of this encounter: 5' 4\" (1.626 m).    Weight as of this encounter: 120 lb 9 oz (54.7 kg).      GEN: Vitals reviewed. Healthy appearing. Patient is in no acute distress. Cooperative with exam.  HEENT: Normocephalic atraumatic.  Pupils equally round.  No scleral icterus, no conjunctival erythema. Oropharynx with no erythema or exudates. Dentition adequate.  NECK: Supple; no thyromegaly.  No neck, cervical LAD.  Submandibular LAD not noted  CV: Heart regular in rate and rhythm with no murmur.    LUNGS: Lungs clear to auscultation bilaterally.  Chest rise equal bilaterally.  No accessory muscle use.  ABD:  nondistended  SKIN: Warm and dry to touch.  No rash on face, arms and legs.  EXT: No clubbing or cyanosis.  No peripheral edema.  PSYCH: Mood is good.  Affect appropriate. Speech fluent. Answers questions appropriately and thought process normal.  NEURO: Alert and oriented to person, place, and time.    Cranial nerves: Pupils equal, round, reactive to light and accomodation. Visual fields full. Extraocular muscles full. Facial sensation to light touch normal. Facial strength symmetric. Hearing normal. Tongue and palate midline. Power of tongue normal. Shoulder shrug normal and symmetric.  SCM muscle tone normal.   Motor: Tone and strength normal and symmetric in distal and proximal BUE and BLE. No " pronator drift or tremor noted today.   Sensory: Normal and symmetric to light touch  Cerebellar: Finger-nose-finger, rapid alternating movements of hands and heel-tap intact. Normal gait.     LABS: 6/28/2018 - Personally ordered/reviewed  Results for orders placed or performed in visit on 06/28/18   CBC and Differential   Result Value Ref Range    WBC 6.9 4.0 - 11.0 10e9/L    RBC Count 4.93 3.8 - 5.2 10e12/L    Hemoglobin 13.8 11.7 - 15.7 g/dL    Hematocrit 42.2 35.0 - 47.0 %    MCV 86 78 - 100 fl    MCH 28.0 26.5 - 33.0 pg    MCHC 32.7 31.5 - 36.5 g/dL    RDW 13.5 10.0 - 15.0 %    Platelet Count 251 150 - 450 10e9/L    Diff Method Automated Method     % Neutrophils 63.7 %    % Lymphocytes 25.8 %    % Monocytes 8.0 %    % Eosinophils 1.5 %    % Basophils 0.4 %    % Immature Granulocytes 0.6 %    Absolute Neutrophil 4.4 1.6 - 8.3 10e9/L    Absolute Lymphocytes 1.8 0.8 - 5.3 10e9/L    Absolute Monocytes 0.6 0.0 - 1.3 10e9/L    Absolute Eosinophils 0.1 0.0 - 0.7 10e9/L    Absolute Basophils 0.0 0.0 - 0.2 10e9/L    Abs Immature Granulocytes 0.0 0 - 0.4 10e9/L   Comprehensive metabolic panel   Result Value Ref Range    Sodium 130 (L) 134 - 144 mmol/L    Potassium 4.8 3.5 - 5.1 mmol/L    Chloride 95 (L) 98 - 107 mmol/L    Carbon Dioxide 29 21 - 31 mmol/L    Anion Gap 6 3 - 14 mmol/L    Glucose 98 70 - 105 mg/dL    Urea Nitrogen 9 7 - 25 mg/dL    Creatinine 0.69 0.60 - 1.20 mg/dL    GFR Estimate 82 >60 mL/min/1.7m2    GFR Estimate If Black >90 >60 mL/min/1.7m2    Calcium 9.8 8.6 - 10.3 mg/dL    Bilirubin Total 0.8 0.3 - 1.0 mg/dL    Albumin 4.8 3.5 - 5.7 g/dL    Protein Total 7.1 6.4 - 8.9 g/dL    Alkaline Phosphatase 44 34 - 104 U/L    ALT 18 7 - 52 U/L    AST 21 13 - 39 U/L   Lipid Panel   Result Value Ref Range    Cholesterol 183 <200 mg/dL    Triglycerides 66 <150 mg/dL    HDL Cholesterol 68 23 - 92 mg/dL    LDL Cholesterol Calculated 102 (H) <100 mg/dL    Non HDL Cholesterol 115 <130 mg/dL   TSH   Result Value Ref  Range    Thyrotropin 2.13 0.34 - 5.60 IU/mL   Sedimentation Rate (ESR)   Result Value Ref Range    Sed Rate 2 1 - 15 mm/h   CRP inflammation   Result Value Ref Range    CRP Inflammation 0.0 <0.5 mg/L    ;         ASSESSMENT AND PLAN:    Auditory hallucinations  -Etiology is unknown at this time.  With her history of new onset hallucinations with no past issues with mental health and no indication of dementia along some dizziness possibilities include vestibular neuroma, brainstem/cerebellar infarct, hemorrhage or tumor, electrolyte abnormalities although her labs for the most part looked good.  Autoimmune/vasculitic disease is also possible.  Lyme is possible especially given exposure in [place].  The pros and cons of CT versus MRI were discussed with radiology along with the patient and it was determined that CT would not show any small tumor, bleed or concerns including vestibular disease.  MRI would be needed to visualize this.  At this time we will go forward with imaging along with labs as below.  She will plan to see me back in 1 week for follow-up.  - MR Brain w/o & w Contrast  - CBC and Differential  - Comprehensive metabolic panel  - Anti Nuclear Amanda IgG by IFA with Reflex  - Lyme Disease Amanda with reflex to WB Serum  - TSH  - Sedimentation Rate (ESR)  - CRP inflammation    Dizziness  -See above  - MR Brain w/o & w Contrast    Hyperlipidemia, unspecified hyperlipidemia type  -Cholesterol is well controlled at this time.  Continue with diet.  - Lipid Panel    Hyponatremia  -Etiology at this time is unknown.  It is only mild at this time I do not suspect it is the cause of her symptoms.  It is possible that she is volume depleted and is encouraged to increase intake in the form of an electrolyte beverage.    Follow-up in 1 week after testing for results.      MALIK CISNEROS DO   6/28/2018 12:49 PM    This document was prepared using voice generated softwear. While every attempt was made for accuracy,  grammatical errors may exist.

## 2018-06-29 ENCOUNTER — HOSPITAL ENCOUNTER (OUTPATIENT)
Dept: MRI IMAGING | Facility: OTHER | Age: 78
Discharge: HOME | End: 2018-06-29
Attending: INTERNAL MEDICINE | Admitting: INTERNAL MEDICINE
Payer: MEDICARE

## 2018-06-29 DIAGNOSIS — R42 DIZZINESS: ICD-10-CM

## 2018-06-29 DIAGNOSIS — R44.0 AUDITORY HALLUCINATIONS: ICD-10-CM

## 2018-06-29 LAB
ANA PAT SER IF-IMP: ABNORMAL
ANA SER QL IF: POSITIVE
ANA TITR SER IF: ABNORMAL {TITER}
B BURGDOR IGG+IGM SER QL: 0.14 (ref 0–0.89)

## 2018-06-29 PROCEDURE — A9575 INJ GADOTERATE MEGLUMI 0.1ML: HCPCS | Performed by: INTERNAL MEDICINE

## 2018-06-29 PROCEDURE — 25500064 ZZH RX 255 OP 636: Performed by: INTERNAL MEDICINE

## 2018-06-29 PROCEDURE — 70553 MRI BRAIN STEM W/O & W/DYE: CPT

## 2018-06-29 RX ORDER — GADOTERATE MEGLUMINE 376.9 MG/ML
15 INJECTION INTRAVENOUS ONCE
Status: COMPLETED | OUTPATIENT
Start: 2018-06-29 | End: 2018-06-29

## 2018-06-29 RX ADMIN — GADOTERATE MEGLUMINE 10 ML: 376.9 INJECTION INTRAVENOUS at 17:35

## 2018-06-29 ASSESSMENT — PATIENT HEALTH QUESTIONNAIRE - PHQ9: SUM OF ALL RESPONSES TO PHQ QUESTIONS 1-9: 3

## 2018-06-29 ASSESSMENT — ANXIETY QUESTIONNAIRES: GAD7 TOTAL SCORE: 8

## 2018-07-06 ENCOUNTER — OFFICE VISIT (OUTPATIENT)
Dept: INTERNAL MEDICINE | Facility: OTHER | Age: 78
End: 2018-07-06
Attending: INTERNAL MEDICINE
Payer: MEDICARE

## 2018-07-06 VITALS
DIASTOLIC BLOOD PRESSURE: 70 MMHG | SYSTOLIC BLOOD PRESSURE: 136 MMHG | HEART RATE: 76 BPM | TEMPERATURE: 97.3 F | RESPIRATION RATE: 18 BRPM | WEIGHT: 119.56 LBS | HEIGHT: 64 IN | BODY MASS INDEX: 20.41 KG/M2

## 2018-07-06 DIAGNOSIS — E78.5 HYPERLIPIDEMIA, UNSPECIFIED HYPERLIPIDEMIA TYPE: ICD-10-CM

## 2018-07-06 DIAGNOSIS — R44.0 AUDITORY HALLUCINATIONS: ICD-10-CM

## 2018-07-06 DIAGNOSIS — R76.8 ELEVATED ANTINUCLEAR ANTIBODY (ANA) LEVEL: ICD-10-CM

## 2018-07-06 DIAGNOSIS — E87.1 HYPONATREMIA: Primary | ICD-10-CM

## 2018-07-06 LAB — SODIUM SERPL-SCNC: 137 MMOL/L (ref 134–144)

## 2018-07-06 PROCEDURE — 84295 ASSAY OF SERUM SODIUM: CPT | Performed by: INTERNAL MEDICINE

## 2018-07-06 PROCEDURE — G0463 HOSPITAL OUTPT CLINIC VISIT: HCPCS

## 2018-07-06 PROCEDURE — 36415 COLL VENOUS BLD VENIPUNCTURE: CPT | Performed by: INTERNAL MEDICINE

## 2018-07-06 PROCEDURE — 99214 OFFICE O/P EST MOD 30 MIN: CPT | Performed by: INTERNAL MEDICINE

## 2018-07-06 ASSESSMENT — PAIN SCALES - GENERAL: PAINLEVEL: NO PAIN (0)

## 2018-07-06 NOTE — MR AVS SNAPSHOT
After Visit Summary   7/6/2018    Kiesha Garner    MRN: [id number]           Patient Information     Date Of Birth          1940        Visit Information        Provider Department      7/6/2018 3:40 PM Riri Messer DO [facility]        Today's Diagnoses     Hyponatremia    -  1    Auditory hallucinations        Hyperlipidemia, unspecified hyperlipidemia type        Elevated antinuclear antibody (MARÍA) level           Follow-ups after your visit        Follow-up notes from your care team     Return if symptoms worsen or fail to improve.      Your next 10 appointments already scheduled     Aug 27, 2018  9:00 AM CDT   PHYSICAL with Riri Messer DO   [facility] ([facility]    [address]  [place] [address] [phone]              Who to contact     If you have questions or need follow up information about today's clinic visit or your schedule please contact [facility] directly at [phone].  Normal or non-critical lab and imaging results will be communicated to you by Qapitalhart, letter or phone within 4 business days after the clinic has received the results. If you do not hear from us within 7 days, please contact the clinic through [email or web address] or phone. If you have a critical or abnormal lab result, we will notify you by phone as soon as possible.  Submit refill requests through Telebit or call your pharmacy and they will forward the refill request to us. Please allow 3 business days for your refill to be completed.          Additional Information About Your Visit        MyChart Information     [employer] gives you secure access to your electronic health record. If you see a primary care provider, you can also send messages to your care team and make appointments. If you have questions, please call your primary care clinic.  If you do not have a primary care provider, please call  "[phone] and they will assist you.        Care EveryWhere ID     This is your Care EveryWhere ID. This could be used by other organizations to access your [place] medical records  [id number]        Your Vitals Were     Pulse Temperature Respirations Height Breastfeeding? BMI (Body Mass Index)    76 97.3  F (36.3  C) (Tympanic) 18 5' 4\" (1.626 m) No 20.52 kg/m2       Blood Pressure from Last 3 Encounters:   07/06/18 136/70   06/28/18 136/82   06/26/18 165/88    Weight from Last 3 Encounters:   07/06/18 119 lb 9 oz (54.2 kg)   06/28/18 120 lb 9 oz (54.7 kg)   06/26/18 119 lb (54 kg)              We Performed the Following     Sodium        Primary Care Provider Office Phone # Fax #    Riri Messer [phone] [address] [facility] [address]        Equal Access to Services     [facility]: Hadii aad ku hadasho Soomaali, waaxda luqadaha, qaybta kaalmada adeegyada, waxay idiin hayparasn so meredith . So [facility] [phone].    ATENCIÓN: Si habla español, tiene a kruger disposición servicios gratuitos de asistencia lingüística. Llame al [phone].    We comply with applicable federal civil rights laws and [place] laws. We do not discriminate on the basis of race, color, national origin, age, disability, sex, sexual orientation, or gender identity.            Thank you!     Thank you for choosing [facility] AND [facility]  for your care. Our goal is always to provide you with excellent care. Hearing back from our patients is one way we can continue to improve our services. Please take a few minutes to complete the written survey that you may receive in the mail after your visit with us. Thank you!             Your Updated Medication List - Protect others around you: Learn how to safely use, store and throw away your medicines at www.disposemymeds.org.          This list is accurate as of 7/6/18  4:23 PM.  Always use your most recent med list.                   " Brand Name Dispense Instructions for use Diagnosis    BIOTIN 5000 5 MG Caps   Generic drug:  biotin      Take 5 mg by mouth daily        calcium 500/D 500-200 MG-UNIT per tablet   Generic drug:  calcium carb 1250 mg (500 mg [place])/vitamin D 200 unit      Take 1-2 tablets by mouth daily        MULTI-VITAMINS Tabs      Take 1 tablet by mouth daily        PSYLLIUM PO      Take 1 tsp. by mouth daily

## 2018-07-06 NOTE — PROGRESS NOTES
Chief Complaint   Patient presents with     Follow Up For     Brain MRI 06/29/18, labwork         HPI: Ms. Garner is a 78 year old female who presents today for follow up of recent testing.  She was seen approximately a week and half ago for auditory hallucinations.  She does feel that overall these are better now and less frequent.  They do seem to be quieter although continue to occur daily.  She notices the most just before sleep.    Testing was reviewed together today.  Significant findings include a sodium of 130, MARÍA of 1: 320.  MRI was negative.  Additional testing was negative.  Patient has decreased her water intake overall since her labs resulted.  She also decreased her coffee to 1 cup daily from 3.    We had a prolonged conversation today regarding the possible causes of her hallucinations.  She does admit that she continues to be under significant stress.    We did check her cholesterol with labs and it was well controlled.  She is not interested in the cholesterol medication at this time.  Although her overall cardiovascular event risk score is 24%.    She  reports that she is a non-smoker but has been exposed to tobacco smoke. She has never used smokeless tobacco.    Past medical history reviewed as below:     Past Medical History:   Diagnosis Date     Cataract      Duodenal ulcer without hemorrhage or perforation 1999    due to H pylori     Hyperlipidemia 10/24/2012    diet controlled     Osteopenia 04/10/2012     Other seasonal allergic rhinitis      Palpitations     only with increased alcohol   .      ROS  Pertinent ROS was performed and was negative, including for fever, chills, chest pain, shortness of breath, increased lower extremity edema, changes in bowel or bladder, blood in the stool, difficulty swallowing, sores in the mouth. No other concerns, with exception of HPI above.      EXAM:   /70 (BP Location: Right arm, Patient Position: Sitting, Cuff Size: Adult Regular)  Pulse 76  Temp  "97.3  F (36.3  C) (Tympanic)  Resp 18  Ht 5' 4\" (1.626 m)  Wt 119 lb 9 oz (54.2 kg)  Breastfeeding? No  BMI 20.52 kg/m2    Estimated body mass index is 20.52 kg/(m^2) as calculated from the following:    Height as of this encounter: 5' 4\" (1.626 m).    Weight as of this encounter: 119 lb 9 oz (54.2 kg).      GEN: Vitals reviewed. Healthy appearing. Patient is in no acute distress. Cooperative with exam.  HEENT: Normocephalic atraumatic.  Pupils equally round.  No scleral icterus, no conjunctival erythema.   LUNGS: Chest rise equal bilaterally.  No accessory muscle use.  SKIN: Warm and dry to touch.  No rash on face, arms and legs.  EXT: No clubbing or cyanosis.  No peripheral edema.  PSYCH: Mood is good.  Affect appropriate. Speech fluent. Answers questions appropriately and thought process normal.    LABS: 7/6/2018 - Personally ordered/reviewed  Results for orders placed or performed in visit on 07/06/18   Sodium   Result Value Ref Range    Sodium 137 134 - 144 mmol/L           ASSESSMENT AND PLAN:    Hyponatremia  -I suspect this is secondary to polydipsia.  She was encouraged to keep her water intake in moderation.  We will continue to monitor periodically.  - Sodium    Auditory hallucinations  -Etiology at this time is unknown.  There is a potential that her hyponatremia was contributing especially if it was lower prior to her initial check last week.  We also discussed that stress could be contributing.  Additional possibility includes viral illness that is now resolving.  Overall testing has all been reassuring.  With positive MARÍA autoimmune illness is possible however this would be an atypical presentation.  At this time she will continue to monitor her symptoms.  If they continue to resolve no further evaluation will be warranted however if she does continue to have symptoms it would be recommended that we consider referral to neurology.  She is to let me know.    Hyperlipidemia, unspecified " hyperlipidemia type  -At this time her lipids look good.  Her ASCVD risk score is elevated secondary to age.  She is however not interested in any medications.  We will continue to monitor periodically and discuss in the future.    Elevated antinuclear antibody (MARÍA) level  -At this time we discussed her elevated MARÍA.  Additional workup could be warranted however at this time she has no specific symptoms that would be consistent with a particular autoimmune illness.  We will wait to see if any additional symptoms appear or if she has any further concerns and if warranted we will pursue further workup in the future.    Follow-up as needed if symptoms do not resolve in the next 1-2 weeks.    A total of 26 minutes spent with in face-to-face consultation of this patient with greater than 50% spent in counseling and care coordination of above listed medical problems including diagnosis, treatment options with emphasis on risks and benefits of each,prognosis and importance of compliance for each.        MALIK CISNEROS DO   7/6/2018 5:01 PM    This document was prepared using voice generated softwear. While every attempt was made for accuracy, grammatical errors may exist.

## 2018-07-06 NOTE — NURSING NOTE
Patient presents to clinic for follow up after Brain MRI 06/29/18 and lab work.  Shea Marroquin LPN............7/6/2018 3:45 PM

## 2018-07-07 PROBLEM — R76.8 ELEVATED ANTINUCLEAR ANTIBODY (ANA) LEVEL: Status: RESOLVED | Noted: 2018-07-07 | Resolved: 2018-07-07

## 2018-07-07 PROBLEM — R76.8 ELEVATED ANTINUCLEAR ANTIBODY (ANA) LEVEL: Status: ACTIVE | Noted: 2018-07-07

## 2019-07-08 ENCOUNTER — OFFICE VISIT (OUTPATIENT)
Dept: INTERNAL MEDICINE | Facility: OTHER | Age: 79
End: 2019-07-08
Attending: INTERNAL MEDICINE
Payer: MEDICARE

## 2019-07-08 VITALS
HEIGHT: 62 IN | HEART RATE: 73 BPM | OXYGEN SATURATION: 97 % | RESPIRATION RATE: 16 BRPM | WEIGHT: 124.4 LBS | DIASTOLIC BLOOD PRESSURE: 82 MMHG | TEMPERATURE: 97.1 F | SYSTOLIC BLOOD PRESSURE: 124 MMHG | BODY MASS INDEX: 22.89 KG/M2

## 2019-07-08 DIAGNOSIS — Z23 NEED FOR DIPHTHERIA-TETANUS-PERTUSSIS (TDAP) VACCINE: ICD-10-CM

## 2019-07-08 DIAGNOSIS — E78.00 PURE HYPERCHOLESTEROLEMIA: ICD-10-CM

## 2019-07-08 DIAGNOSIS — F43.9 STRESS AT HOME: ICD-10-CM

## 2019-07-08 DIAGNOSIS — Z23 NEED FOR SHINGLES VACCINE: ICD-10-CM

## 2019-07-08 DIAGNOSIS — Z00.00 ENCOUNTER FOR MEDICARE ANNUAL WELLNESS EXAM: Primary | ICD-10-CM

## 2019-07-08 DIAGNOSIS — M85.89 OSTEOPENIA OF MULTIPLE SITES: ICD-10-CM

## 2019-07-08 LAB
CHOLEST SERPL-MCNC: 193 MG/DL
HDLC SERPL-MCNC: 60 MG/DL (ref 23–92)
LDLC SERPL CALC-MCNC: 115 MG/DL
NONHDLC SERPL-MCNC: 133 MG/DL
TRIGL SERPL-MCNC: 90 MG/DL

## 2019-07-08 PROCEDURE — 36415 COLL VENOUS BLD VENIPUNCTURE: CPT | Mod: ZL | Performed by: INTERNAL MEDICINE

## 2019-07-08 PROCEDURE — G0463 HOSPITAL OUTPT CLINIC VISIT: HCPCS

## 2019-07-08 PROCEDURE — 80061 LIPID PANEL: CPT | Mod: ZL | Performed by: INTERNAL MEDICINE

## 2019-07-08 PROCEDURE — 99214 OFFICE O/P EST MOD 30 MIN: CPT | Mod: 25 | Performed by: INTERNAL MEDICINE

## 2019-07-08 PROCEDURE — G0438 PPPS, INITIAL VISIT: HCPCS | Performed by: INTERNAL MEDICINE

## 2019-07-08 ASSESSMENT — MIFFLIN-ST. JEOR: SCORE: 992.52

## 2019-07-08 ASSESSMENT — PAIN SCALES - GENERAL: PAINLEVEL: NO PAIN (0)

## 2019-07-08 NOTE — PROGRESS NOTES
"     Medicare Wellness Visit   Ms. Garner is a 79 year old female who presents today who presents for Preventive Visit.      Are you in the first 12 months of your Medicare coverage?  No      Health Risk Assessment     Do you feel safe in your environment? Yes    Physical Health:    In general, how would you rate your overall physical health? excellent    Outside of work, how many days during the week do you exercise? 2-3 days/week    Outside of work, approximately how many minutes a day do you exercise?45-60 minutes    If you drink alcohol do you typically have >3 drinks per day or >7 drinks per week? No    Do you usually eat at least 4 servings of fruit and vegetables a day, include whole grains & fiber and avoid regularly eating high fat or \"junk\" foods? Yes    Do you have any problems taking medications regularly?  No    Do you have any side effects from medications? not applicable    Needs assistance for the following daily activities: no assistance needed    Which of the following safety concerns are present in your home?  none identified     Hearing impairment: No    In the past 6 months, have you been bothered by leaking of urine? no      Screening       Fall risk  Fallen 2 or more times in the past year?: No  Any fall with injury in the past year?: No    Cognitive Screening   1) Repeat 3 items (Leader, Season, Table)    2) Clock draw: NORMAL  3) 3 item recall: Recalls 2 objects   Results: NORMAL clock, 1-2 items recalled: COGNITIVE IMPAIRMENT LESS LIKELY    Mini-CogTM Copyright MAJOR Richey. Licensed by the author for use in [facility]; reprinted with permission (michael@.[facility]). All rights reserved.      Do you have sleep apnea, excessive snoring or daytime drowsiness?: no      Medical Record Update       Reviewed and updated as needed this visit by clinical staff  Tobacco  Allergies  Meds  Med Hx  Surg Hx  Fam Hx  Soc Hx        Reviewed and updated as needed this visit by Provider  Tobacco  " "Allergies  Meds  Med Hx  Surg Hx  Fam Hx  Soc Hx          Current providers sharing in care for this patient include:   Patient Care Team:  Riri Messer DO as PCP - General (Internal Medicine)  Riri Messer DO as Assigned PCP     Subjective:     She has had increased stress at home.  Her  has significant medical issues and she is his primary care provider.  She does have some desire to travel however is anxious about leaving him at home by himself.  She does have some frustration regarding this.  She is curious what options are available.    She has a history of hyperlipidemia.  She is due for recheck.  She has gained a little bit of weight and is concerned that they will be elevated.  She does continue to walk daily.    She has a history of osteopenia.  Her last DEXA scan was in 2015.  She has not had any fractures.  She is on calcium and vitamin D.    Review of Systems  GEN: -fevers/-chills/-night sweats/+wt change - 5lb increase  NOSE: -drainage/-congestion  MOUTH/THROAT: - sore throat/-dysphagia/-sores  LUNGS: -sob/-cough  CARDIOVASCULAR: -cp/-palpitations  ABD: -pain/-bowel changes/-bloody stools  : -change in bladder/-vaginal discharge or bleeding  MSK/RHEUM: +joint pain/-swelling  PSYCH: -anhedonia/-depression/+anxiety        OBJECTIVE:   /82 (BP Location: Right arm, Patient Position: Sitting, Cuff Size: Adult Regular)   Pulse 73   Temp 97.1  F (36.2  C) (Tympanic)   Resp 16   Ht 1.575 m (5' 2\")   Wt 56.4 kg (124 lb 6.4 oz)   SpO2 97%   Breastfeeding? No   BMI 22.75 kg/m      Estimated body mass index is 22.75 kg/m  as calculated from the following:    Height as of this encounter: 1.575 m (5' 2\").    Weight as of this encounter: 56.4 kg (124 lb 6.4 oz).     Physical Exam  GENERAL APPEARANCE: healthy, alert and no distress  EYES: Eyes grossly normal to inspection, PERRL and conjunctivae and sclerae normal  HENT: mouth without ulcers or lesions, oropharynx clear and oral mucous " membranes moist  NECK: no adenopathy, no asymmetry, masses, or scars and thyroid normal to palpation  RESP: lungs clear to auscultation - no rales, rhonchi or wheezes  CV: regular rate and rhythm, normal S1 S2, no S3 or S4, no murmur, click or rub, no peripheral edema and peripheral pulses strong  ABDOMEN: soft, nondistended  MS: no musculoskeletal defects are noted and gait is age appropriate without ataxia  SKIN: no suspicious lesions or rashes  NEURO: Normal strength and tone, sensory exam grossly normal, mentation intact and speech normal  PSYCH: mentation appears normal and affect normal/bright    Labs reviewed in EPIC    ASSESSMENT / PLAN:   Encounter for Medicare annual wellness exam    Pure hypercholesterolemia  Lipids rechecked today and overall stable with minimal increase.  Continue to monitor.  Work on diet and activity level.  - Lipid Panel    Need for shingles vaccine  Rx given  - zoster vaccine recombinant adjuvanted (SHINGRIX) injection  Dispense: 0.5 mL; Refill: 0    Need for diphtheria-tetanus-pertussis (Tdap) vaccine  Rx given  - Tdap, tetanus-diphtheria-acell pertussis, (ADACEL) 5-2-15.5 LF-MCG/0.5 injection  Dispense: 0.5 mL; Refill: 0    Osteopenia of multiple sites  -Continue on calcium and vitamin D.  - DX Hip/Pelvis/Spine    Stress at home  -We had a long conversation today regarding this spending greater than 25 minutes in face-to-face time discussing options for care for her , importance of self-care, and patient was encouraged to be sure she is doing what she needs to to maintain her health and quality of life.        End of Life Planning     Do you have a Health Care Directive? Yes: Advance Directive has been received and scanned.    End of Life Planning:  Patient currently has an advanced directive: Yes.  Practitioner is supportive of decision.    Preventative Care Guidelines and Health Counseling     COUNSELING:  Reviewed preventive health counseling, as reflected in patient  instructions       Regular exercise       Immunizations    Vaccinated for: TDAP and Zoster             Osteoporosis Prevention/Bone Health     Body mass index is 22.75 kg/m .         Appropriate preventive services were discussed with this patient, including applicable screening as appropriate for cardiovascular disease, diabetes, osteopenia/osteoporosis, and glaucoma.  As appropriate for age/gender, discussed screening for colorectal cancer, prostate cancer, breast cancer, and cervical cancer. Checklist reviewing preventive services available has been given to the patient.    Reviewed patients plan of care and provided an AVS. The Basic Care Plan (routine screening as documented in Health Maintenance) for patient meets the Care Plan requirement. This Care Plan has been established and reviewed with the Patient and any available family members.    Counseling Resources:  ATP IV Guidelines  Pooled Cohorts Equation Calculator  Breast Cancer Risk Calculator  FRAX Risk Assessment  ICSI Preventive Guidelines  Dietary Guidelines for Americans, 2010  USDA's MyPlate  ASA Prophylaxis  Lung CA Screening    Riri Messer DO  7/8/2019  10:09 AM  [facility]

## 2019-07-08 NOTE — PATIENT INSTRUCTIONS
Patient Education   Personalized Prevention Plan  You are due for the preventive services outlined below.  Your care team is available to assist you in scheduling these services.  If you have already completed any of these items, please share that information with your care team to update in your medical record.  Health Maintenance Due   Topic Date Due     Diptheria Tetanus Pertussis (DTAP/TDAP/TD) Vaccine (1 - Tdap) 05/10/1965     Annual Wellness Visit  05/10/2005     Zoster (Shingles) Vaccine (2 of 3) 08/02/2013     PHQ-2  01/01/2019     Discuss Advance Directive Planning  01/02/2019     FALL RISK ASSESSMENT  07/06/2019

## 2019-07-08 NOTE — NURSING NOTE
Patient presents to the clinic for medicare wellness check.     Medication Reconciliation: complete   Ami Oden LPN............. July 8, 2019 10:05 AM

## 2019-07-19 ENCOUNTER — HOSPITAL ENCOUNTER (OUTPATIENT)
Dept: BONE DENSITY | Facility: OTHER | Age: 79
Discharge: HOME | End: 2019-07-19
Attending: INTERNAL MEDICINE | Admitting: INTERNAL MEDICINE
Payer: MEDICARE

## 2019-07-19 DIAGNOSIS — M85.89 OSTEOPENIA OF MULTIPLE SITES: ICD-10-CM

## 2019-07-19 PROCEDURE — 77080 DXA BONE DENSITY AXIAL: CPT

## 2019-10-25 ENCOUNTER — ALLIED HEALTH/NURSE VISIT (OUTPATIENT)
Dept: FAMILY MEDICINE | Facility: OTHER | Age: 79
End: 2019-10-25
Attending: INTERNAL MEDICINE
Payer: MEDICARE

## 2019-10-25 DIAGNOSIS — Z23 NEED FOR PROPHYLACTIC VACCINATION AND INOCULATION AGAINST INFLUENZA: Primary | ICD-10-CM

## 2019-10-25 PROCEDURE — G0008 ADMIN INFLUENZA VIRUS VAC: HCPCS

## 2019-10-25 PROCEDURE — 90662 IIV NO PRSV INCREASED AG IM: CPT

## 2020-12-27 ENCOUNTER — HEALTH MAINTENANCE LETTER (OUTPATIENT)
Age: 80
End: 2020-12-27

## 2021-03-06 ENCOUNTER — IMMUNIZATION (OUTPATIENT)
Dept: FAMILY MEDICINE | Facility: OTHER | Age: 81
End: 2021-03-06
Attending: FAMILY MEDICINE
Payer: MEDICARE

## 2021-03-06 PROCEDURE — 0031A PR COVID VAC JANSSEN AD26 0.5ML: CPT

## 2021-09-04 ENCOUNTER — ALLIED HEALTH/NURSE VISIT (OUTPATIENT)
Dept: FAMILY MEDICINE | Facility: OTHER | Age: 81
End: 2021-09-04
Attending: FAMILY MEDICINE
Payer: MEDICARE

## 2021-09-04 DIAGNOSIS — R05.9 COUGH: Primary | ICD-10-CM

## 2021-09-04 PROCEDURE — U0005 INFEC AGEN DETEC AMPLI PROBE: HCPCS | Mod: ZL

## 2021-09-04 PROCEDURE — C9803 HOPD COVID-19 SPEC COLLECT: HCPCS

## 2021-09-05 LAB — SARS-COV-2 RNA RESP QL NAA+PROBE: NEGATIVE

## 2021-09-24 ENCOUNTER — DOCUMENTATION ONLY (OUTPATIENT)
Dept: OTHER | Facility: CLINIC | Age: 81
End: 2021-09-24

## 2021-10-09 ENCOUNTER — HEALTH MAINTENANCE LETTER (OUTPATIENT)
Age: 81
End: 2021-10-09

## 2021-11-22 ENCOUNTER — OFFICE VISIT (OUTPATIENT)
Dept: INTERNAL MEDICINE | Facility: OTHER | Age: 81
End: 2021-11-22
Attending: INTERNAL MEDICINE
Payer: MEDICARE

## 2021-11-22 VITALS
OXYGEN SATURATION: 99 % | SYSTOLIC BLOOD PRESSURE: 138 MMHG | BODY MASS INDEX: 21.62 KG/M2 | RESPIRATION RATE: 16 BRPM | DIASTOLIC BLOOD PRESSURE: 70 MMHG | TEMPERATURE: 97.1 F | HEIGHT: 63 IN | WEIGHT: 122 LBS | HEART RATE: 87 BPM

## 2021-11-22 DIAGNOSIS — M85.89 OSTEOPENIA OF MULTIPLE SITES: ICD-10-CM

## 2021-11-22 DIAGNOSIS — Z13.220 SCREENING FOR HYPERLIPIDEMIA: ICD-10-CM

## 2021-11-22 DIAGNOSIS — Z23 NEED FOR INFLUENZA VACCINATION: ICD-10-CM

## 2021-11-22 DIAGNOSIS — Z13.1 SCREENING FOR DIABETES MELLITUS: Primary | ICD-10-CM

## 2021-11-22 LAB
ALBUMIN SERPL-MCNC: 4.6 G/DL (ref 3.5–5.7)
ALP SERPL-CCNC: 53 U/L (ref 34–104)
ALT SERPL W P-5'-P-CCNC: 20 U/L (ref 7–52)
ANION GAP SERPL CALCULATED.3IONS-SCNC: 7 MMOL/L (ref 3–14)
AST SERPL W P-5'-P-CCNC: 21 U/L (ref 13–39)
BILIRUB SERPL-MCNC: 0.8 MG/DL (ref 0.3–1)
BUN SERPL-MCNC: 10 MG/DL (ref 7–25)
CALCIUM SERPL-MCNC: 9 MG/DL (ref 8.6–10.3)
CHLORIDE BLD-SCNC: 98 MMOL/L (ref 98–107)
CHOLEST SERPL-MCNC: 221 MG/DL
CO2 SERPL-SCNC: 29 MMOL/L (ref 21–31)
CREAT SERPL-MCNC: 0.78 MG/DL (ref 0.6–1.2)
ERYTHROCYTE [DISTWIDTH] IN BLOOD BY AUTOMATED COUNT: 13.6 % (ref 10–15)
FASTING STATUS PATIENT QL REPORTED: YES
GFR SERPL CREATININE-BSD FRML MDRD: 72 ML/MIN/1.73M2
GLUCOSE BLD-MCNC: 89 MG/DL (ref 70–105)
HCT VFR BLD AUTO: 42.3 % (ref 35–47)
HDLC SERPL-MCNC: 69 MG/DL (ref 23–92)
HGB BLD-MCNC: 14 G/DL (ref 11.7–15.7)
LDLC SERPL CALC-MCNC: 130 MG/DL
MCH RBC QN AUTO: 28.8 PG (ref 26.5–33)
MCHC RBC AUTO-ENTMCNC: 33.1 G/DL (ref 31.5–36.5)
MCV RBC AUTO: 87 FL (ref 78–100)
NONHDLC SERPL-MCNC: 152 MG/DL
PLATELET # BLD AUTO: 237 10E3/UL (ref 150–450)
POTASSIUM BLD-SCNC: 4.1 MMOL/L (ref 3.5–5.1)
PROT SERPL-MCNC: 6.7 G/DL (ref 6.4–8.9)
RBC # BLD AUTO: 4.86 10E6/UL (ref 3.8–5.2)
SODIUM SERPL-SCNC: 134 MMOL/L (ref 134–144)
TRIGL SERPL-MCNC: 109 MG/DL
WBC # BLD AUTO: 8.6 10E3/UL (ref 4–11)

## 2021-11-22 PROCEDURE — G0008 ADMIN INFLUENZA VIRUS VAC: HCPCS

## 2021-11-22 PROCEDURE — 99213 OFFICE O/P EST LOW 20 MIN: CPT | Performed by: INTERNAL MEDICINE

## 2021-11-22 PROCEDURE — G0463 HOSPITAL OUTPT CLINIC VISIT: HCPCS

## 2021-11-22 PROCEDURE — 80061 LIPID PANEL: CPT | Mod: ZL | Performed by: INTERNAL MEDICINE

## 2021-11-22 PROCEDURE — 90662 IIV NO PRSV INCREASED AG IM: CPT

## 2021-11-22 PROCEDURE — 36415 COLL VENOUS BLD VENIPUNCTURE: CPT | Mod: ZL | Performed by: INTERNAL MEDICINE

## 2021-11-22 PROCEDURE — G0463 HOSPITAL OUTPT CLINIC VISIT: HCPCS | Mod: 25

## 2021-11-22 PROCEDURE — 85027 COMPLETE CBC AUTOMATED: CPT | Mod: ZL | Performed by: INTERNAL MEDICINE

## 2021-11-22 PROCEDURE — 80053 COMPREHEN METABOLIC PANEL: CPT | Mod: ZL | Performed by: INTERNAL MEDICINE

## 2021-11-22 ASSESSMENT — MIFFLIN-ST. JEOR: SCORE: 979.58

## 2021-11-22 ASSESSMENT — PAIN SCALES - GENERAL: PAINLEVEL: NO PAIN (0)

## 2021-11-22 NOTE — NURSING NOTE
Patient presents to clinic today for recheck on her health issues and requesting labs for cholesterol. She is fasting.  Medication reconciliation completed.    ACP on file? Yes    FOOD SECURITY SCREENING QUESTIONS  Hunger Vital Signs:  Within the past 12 months we worried whether our food would run out before we got money to buy more. Never  Within the past 12 months the food we bought just didn't last and we didn't have money to get more. Never    Sarah Mohan CMA([facility])..................11/22/2021   8:36 AM        Immunization Documentation    Prior to Fluzone HD Immunization administration, verified patients identity using patient's name and date of birth. Please see IMMUNIZATIONS  and order for additional information.  Patient / Parent instructed to remain in clinic for 15 minutes and report any adverse reaction to staff immediately.    Was entire vial of medication used? Yes  Vial/Syringe: Syringe    Sarah Mohan CMA([facility]).............11/22/2021  9:11 AM

## 2021-11-22 NOTE — PROGRESS NOTES
"Chief Complaint   Patient presents with     RECHECK         HPI: Ms. Garner is a 81 year old female who presents today for follow up of concern for cholesterol.    Her cholesterol has been borderline in the past.  She feels like she has been walking less due to the recent cold weather.  She is concerned for her cholesterol because of this.  She also misses exercising in regular classes which have been suspended due to Covid.    She is also due for follow-up of her blood sugar to screen for diabetes.    She has a history of osteopenia.  Her last DEXA scan showed a T score of -1.7.  She is due for recheck today.    We discussed today how she is doing since her  passed away.  She feels overall things are okay although finds that it is worse when the weather gets cooler and she is not able to get out as much.  She is interested in trying to travel however does not feel comfortable going alone    She  reports that she is a non-smoker but has been exposed to tobacco smoke. She has never used smokeless tobacco.     Past medical history reviewed as below:     Past Medical History:   Diagnosis Date     Cataract      Duodenal ulcer without hemorrhage or perforation 1999    due to H pylori     Elevated antinuclear antibody (MARÍA) level 7/7/2018     Hyperlipidemia 10/24/2012    diet controlled     Osteopenia 04/10/2012     Other seasonal allergic rhinitis      Palpitations     only with increased alcohol   .      ROS  Pertinent ROS was performed and was negative, including for fever, chills, chest pain, shortness of breath, increased lower extremity edema, changes in bowel or bladder, blood in the stool. No other concerns, with exception of HPI above.      EXAM:   /70 (BP Location: Right arm, Patient Position: Sitting, Cuff Size: Adult Regular)   Pulse 87   Temp 97.1  F (36.2  C) (Tympanic)   Resp 16   Ht 1.588 m (5' 2.5\")   Wt 55.3 kg (122 lb)   SpO2 99%   BMI 21.96 kg/m      Estimated body mass index is " "21.96 kg/m  as calculated from the following:    Height as of this encounter: 1.588 m (5' 2.5\").    Weight as of this encounter: 55.3 kg (122 lb).      GEN: Vitals reviewed. Healthy appearing. Patient is in no acute distress. Cooperative with exam.  HEENT: Normocephalic atraumatic.  Eyes grossly normal to inspection.  No discharge or erythema, or obvious scleral/conjunctival abnormalities.   NECK: Supple; no thyromegaly or masses noted.  No cervical or supraclavicular lymphadenopathy.  CV: Heart regular in rate and rhythm with no murmur.    LUNGS: No audible wheeze, cough, or visible cyanosis.  No visible retractions or increased work of breathing.  Lungs clear to auscultation bilaterally.    ABD:  Nondistended  SKIN: Warm and dry to touch.  Visible skin clear. No significant rash, abnormal pigmentation or lesions.  EXT: No clubbing or cyanosis.  No peripheral edema.  PSYCH: Mood is good.  Mentation appears normal, affect normal/bright, judgement and insight intact, normal speech and appearance well-groomed.     ASSESSMENT AND PLAN:    Screening for diabetes mellitus  - Comprehensive metabolic panel    Screening for hyperlipidemia  We discussed continuing to work on exercise, monitoring her diet and we will recheck lipids today.  - Lipid Profile    Osteopenia of multiple sites  Plan for repeat DEXA scan.  Blood counts, calcium and kidney function obtained today.  - CBC with platelets  - DX Hip/Pelvis/Spine    Need for influenza vaccine  -Given today       Return in about 1 year (around 11/22/2022) for Annual Review with renewal of all medications.    MALIK CISNEROS, DO   11/22/2021 8:52 AM      "

## 2021-12-06 ENCOUNTER — HOSPITAL ENCOUNTER (OUTPATIENT)
Dept: BONE DENSITY | Facility: OTHER | Age: 81
Discharge: HOME | End: 2021-12-06
Attending: INTERNAL MEDICINE | Admitting: INTERNAL MEDICINE
Payer: MEDICARE

## 2021-12-06 DIAGNOSIS — M85.89 OSTEOPENIA OF MULTIPLE SITES: ICD-10-CM

## 2021-12-06 PROCEDURE — 77080 DXA BONE DENSITY AXIAL: CPT

## 2022-01-29 ENCOUNTER — HEALTH MAINTENANCE LETTER (OUTPATIENT)
Age: 82
End: 2022-01-29

## 2022-04-04 ENCOUNTER — VIRTUAL VISIT (OUTPATIENT)
Dept: INTERNAL MEDICINE | Facility: OTHER | Age: 82
End: 2022-04-04
Attending: INTERNAL MEDICINE
Payer: MEDICARE

## 2022-04-04 ENCOUNTER — NURSE TRIAGE (OUTPATIENT)
Dept: INTERNAL MEDICINE | Facility: OTHER | Age: 82
End: 2022-04-04

## 2022-04-04 DIAGNOSIS — R09.81 SINUS CONGESTION: Primary | ICD-10-CM

## 2022-04-04 PROCEDURE — 99441 PR PHYSICIAN TELEPHONE EVALUATION 5-10 MIN: CPT | Mod: 95 | Performed by: INTERNAL MEDICINE

## 2022-04-04 NOTE — TELEPHONE ENCOUNTER
S-(situation): patient calling and states that she developed a cough about 5 days ago    B-(background): patient states she is never sick     A-(assessment): patient states that 5 days ago she developed a cough, runny nose, fever, nasal congestion, sore throat.  States last night she could not sleep due to blowing her nose, sore throat, chest hurt and cough.  States was coughing up and blowing nose thick greenish/yellowish with blood.   was running a fever 102 then 101 with digital and ear thermometer but today does not have temp.  States today she has not had any blood.  States has been using Ibuprofen, Vicks, drinking lot of hot fluids.   has something like a neti pot and will try that reviewed home instructions on this with patient.  Patient denies chest pain, shortness of breath , wheezing or any bleeding at this time.  Patient states she just would like an antibiotic.    Patient states she wont come in.      R-(recommendations): advised patient to schedule an appointment and patient refused.  Informed patient will route to Dr. Messer for review and consideration.    Did schedule patient for phone visit with Dr. Messer today.    Martina Montgomery RN on 4/4/2022 at 11:49 AM      Reason for Disposition    Patient wants to be seen    Additional Information    Negative: Bluish (or gray) lips or face    Negative: Severe difficulty breathing (e.g., struggling for each breath, speaks in single words)    Negative: Rapid onset of cough and has hives    Negative: Coughing started suddenly after medicine, an allergic food or bee sting    Negative: Difficulty breathing after exposure to flames, smoke, or fumes    Negative: Sounds like a life-threatening emergency to the triager    Negative: Previous asthma attacks and this feels like asthma attack    Negative: Chest pain present when not coughing    Negative: Difficulty breathing    Negative: Passed out (i.e., fainted, collapsed and was not responding)    Negative:  "Patient sounds very sick or weak to the triager    Negative: Sounds like a life-threatening emergency to the triager    Negative: Difficulty breathing, and not from stuffy nose (e.g., not relieved by cleaning out the nose)    Negative: SEVERE headache and has fever    Negative: Patient sounds very sick or weak to the triager    Negative: SEVERE sinus pain    Negative: Severe headache    Negative: Redness or swelling on the cheek, forehead, or around the eye    Negative: Fever > 103 F (39.4 C)    Negative: Fever > 101 F (38.3 C) and over 60 years of age    Negative: Fever > 100.0 F (37.8 C) and has diabetes mellitus or a weak immune system (e.g., HIV positive, cancer chemotherapy, organ transplant, splenectomy, chronic steroids)    Negative: Fever > 100.0 F (37.8 C) and bedridden (e.g., nursing home patient, stroke, chronic illness, recovering from surgery)    Negative: Sinus congestion (pressure, fullness) present > 10 days    Negative: Nasal discharge present > 10 days    Negative: Using nasal washes and pain medicine > 24 hours and sinus pain (lower forehead, cheekbone, or eye) persists    Negative: Lots of coughing    Answer Assessment - Initial Assessment Questions  1. ONSET: \"When did the cough begin?\"       5 day  2. SEVERITY: \"How bad is the cough today?\"       Not as bad today  3. RESPIRATORY DISTRESS: \"Describe your breathing.\"       denies  4. FEVER: \"Do you have a fever?\" If so, ask: \"What is your temperature, how was it measured, and when did it start?\"      102 then 101 today is normal, digital and ear   5. HEMOPTYSIS: \"Are you coughing up any blood?\" If so ask: \"How much?\" (flecks, streaks, tablespoons, etc.)      Blowing and coughing up thick green/yellow with blood in it. States today so far no blood in it.  6. TREATMENT: \"What have you done so far to treat the cough?\" (e.g., meds, fluids, humidifier)      Doing gargling with antiseptic mouth rinse, vicks in nose, ibuprofen, drinking hot fluids.  7. " "CARDIAC HISTORY: \"Do you have any history of heart disease?\" (e.g., heart attack, congestive heart failure)       denies  8. LUNG HISTORY: \"Do you have any history of lung disease?\"  (e.g., pulmonary embolus, asthma, emphysema)      denies  9. PE RISK FACTORS: \"Do you have a history of blood clots?\" (or: recent major surgery, recent prolonged travel, bedridden)      denies  10. OTHER SYMPTOMS: \"Do you have any other symptoms? (e.g., runny nose, wheezing, chest pain)        Runny nose,   11. PREGNANCY: \"Is there any chance you are pregnant?\" \"When was your last menstrual period?\"        n/a  12. TRAVEL: \"Have you traveled out of the country in the last month?\" (e.g., travel history, exposures)        denies    Protocols used: SINUS PAIN OR CONGESTION-A-OH, COUGH-A-OH    "

## 2022-04-04 NOTE — NURSING NOTE
Patient requests telephone visit today for possible sinus infection or other URI.  Medication list reviewed.  Sarah Mohan CMA([facility])..................4/4/2022   2:29 PM

## 2022-04-04 NOTE — PROGRESS NOTES
Kiesha is a 81 year old who is being evaluated via a billable telephone visit.      What phone number would you like to be contacted at? [phone]  How would you like to obtain your AVS? MyChart    Assessment & Plan     Sinus congestion  - Likely viral in nature.  Recommend conservative treatment including symptomatic relief and use of nasal rinses  - Discussed reason for no antibiotics and risk of antibiotic use.    - Patient to call if symptoms do not improve over the next 48 to 72 hours and will consider antibiotics at that time.    -If she does not have improvement over the next few days she is encouraged to repeat a COVID test  - Recommend increased fluid intake, humidified air and rest as much as possible.    Return if symptoms worsen or fail to improve.    Riri Messer,   [facility]    Subjective   Kiesha is a 81 year old who presents for the following health issues:    She is having significant sinus congestion.  This started about 5 days ago. She has been using Tim med sinus rinse which helped some.  She does feel that her symptoms wax and wane.  She has had some sinus pain when she is laying down at night.  The pain is in her throat and ears.  She denies any shortness of breath.    Review of Systems   She denies any outright fevers and has not had any bowel issues.        Objective    Vitals - Patient Reported  Weight (Patient Reported): 54.4 kg (120 lb)  Temperature (Patient Reported): 97  F (36.1  C)  Pain Score: Mild Pain (2)  Pain Loc: Head        Physical Exam   No resp distress and hoarse  PSYCH: Alert and oriented times 3; coherent speech, normal   rate and volume, able to articulate logical thoughts, able   to abstract reason, no tangential thoughts, no hallucinations   or delusions  Her affect is normal  RESP: No cough, no audible wheezing, able to talk in full sentences  Remainder of exam unable to be completed due to telephone visits        Phone call duration: 7  minutes

## 2022-04-04 NOTE — TELEPHONE ENCOUNTER
Gui-patient is sick and would like an antibiotic for something she states she is sick and does not want to come in     Please call and advise    Thank You    Hilary Galvez on 4/4/2022 at 9:43 AM

## 2022-09-17 ENCOUNTER — HEALTH MAINTENANCE LETTER (OUTPATIENT)
Age: 82
End: 2022-09-17

## 2022-11-02 ENCOUNTER — OFFICE VISIT (OUTPATIENT)
Dept: INTERNAL MEDICINE | Facility: OTHER | Age: 82
End: 2022-11-02
Attending: INTERNAL MEDICINE
Payer: MEDICARE

## 2022-11-02 VITALS
RESPIRATION RATE: 16 BRPM | HEIGHT: 63 IN | DIASTOLIC BLOOD PRESSURE: 78 MMHG | SYSTOLIC BLOOD PRESSURE: 144 MMHG | OXYGEN SATURATION: 98 % | WEIGHT: 122 LBS | TEMPERATURE: 97.5 F | BODY MASS INDEX: 21.62 KG/M2 | HEART RATE: 70 BPM

## 2022-11-02 DIAGNOSIS — Z23 NEED FOR INFLUENZA VACCINATION: ICD-10-CM

## 2022-11-02 DIAGNOSIS — Z13.1 SCREENING FOR DIABETES MELLITUS: ICD-10-CM

## 2022-11-02 DIAGNOSIS — Z00.00 ENCOUNTER FOR MEDICARE ANNUAL WELLNESS EXAM: Primary | ICD-10-CM

## 2022-11-02 DIAGNOSIS — M85.89 OSTEOPENIA OF MULTIPLE SITES: ICD-10-CM

## 2022-11-02 DIAGNOSIS — Z13.220 SCREENING FOR HYPERLIPIDEMIA: ICD-10-CM

## 2022-11-02 LAB
ALBUMIN SERPL-MCNC: 4.6 G/DL (ref 3.5–5.7)
ALP SERPL-CCNC: 46 U/L (ref 34–104)
ALT SERPL W P-5'-P-CCNC: 22 U/L (ref 7–52)
ANION GAP SERPL CALCULATED.3IONS-SCNC: 8 MMOL/L (ref 3–14)
AST SERPL W P-5'-P-CCNC: 21 U/L (ref 13–39)
BILIRUB SERPL-MCNC: 1 MG/DL (ref 0.3–1)
BUN SERPL-MCNC: 18 MG/DL (ref 7–25)
CALCIUM SERPL-MCNC: 9.8 MG/DL (ref 8.6–10.3)
CHLORIDE BLD-SCNC: 100 MMOL/L (ref 98–107)
CHOLEST SERPL-MCNC: 222 MG/DL
CO2 SERPL-SCNC: 29 MMOL/L (ref 21–31)
CREAT SERPL-MCNC: 0.88 MG/DL (ref 0.6–1.2)
FASTING STATUS PATIENT QL REPORTED: YES
GFR SERPL CREATININE-BSD FRML MDRD: 65 ML/MIN/1.73M2
GLUCOSE BLD-MCNC: 89 MG/DL (ref 70–105)
HDLC SERPL-MCNC: 77 MG/DL (ref 23–92)
LDLC SERPL CALC-MCNC: 128 MG/DL
NONHDLC SERPL-MCNC: 145 MG/DL
POTASSIUM BLD-SCNC: 4 MMOL/L (ref 3.5–5.1)
PROT SERPL-MCNC: 7.1 G/DL (ref 6.4–8.9)
SODIUM SERPL-SCNC: 137 MMOL/L (ref 134–144)
TRIGL SERPL-MCNC: 86 MG/DL

## 2022-11-02 PROCEDURE — G0008 ADMIN INFLUENZA VIRUS VAC: HCPCS

## 2022-11-02 PROCEDURE — 36415 COLL VENOUS BLD VENIPUNCTURE: CPT | Mod: ZL | Performed by: INTERNAL MEDICINE

## 2022-11-02 PROCEDURE — 80053 COMPREHEN METABOLIC PANEL: CPT | Mod: ZL | Performed by: INTERNAL MEDICINE

## 2022-11-02 PROCEDURE — G0439 PPPS, SUBSEQ VISIT: HCPCS | Performed by: INTERNAL MEDICINE

## 2022-11-02 PROCEDURE — 82040 ASSAY OF SERUM ALBUMIN: CPT | Mod: ZL | Performed by: INTERNAL MEDICINE

## 2022-11-02 PROCEDURE — 80061 LIPID PANEL: CPT | Mod: ZL | Performed by: INTERNAL MEDICINE

## 2022-11-02 ASSESSMENT — ENCOUNTER SYMPTOMS
NERVOUS/ANXIOUS: 0
CHILLS: 0
JOINT SWELLING: 0
PALPITATIONS: 0
HEMATOCHEZIA: 0
PARESTHESIAS: 0
CONSTIPATION: 0
MYALGIAS: 0
ARTHRALGIAS: 0
FEVER: 0
WEAKNESS: 0
SORE THROAT: 0
DIZZINESS: 0
NAUSEA: 0
FREQUENCY: 0
ABDOMINAL PAIN: 0
SHORTNESS OF BREATH: 0
COUGH: 0
BREAST MASS: 0
DIARRHEA: 0
HEADACHES: 0
DYSURIA: 0
HEARTBURN: 0
EYE PAIN: 0
HEMATURIA: 0

## 2022-11-02 ASSESSMENT — ACTIVITIES OF DAILY LIVING (ADL): CURRENT_FUNCTION: NO ASSISTANCE NEEDED

## 2022-11-02 NOTE — PATIENT INSTRUCTIONS
Consider getting your COVID booster in 2-3 weeks    Patient Education   Personalized Prevention Plan  You are due for the preventive services outlined below.  Your care team is available to assist you in scheduling these services.  If you have already completed any of these items, please share that information with your care team to update in your medical record.  Health Maintenance Due   Topic Date Due    Hepatitis B Vaccine (1 of 3 - 3-dose series) Never done    Annual Wellness Visit  07/08/2020    COVID-19 Vaccine (2 - Booster for Christina series) 05/01/2021    Flu Vaccine (1) 09/01/2022

## 2022-11-02 NOTE — PROGRESS NOTES
"SUBJECTIVE:   Kiesha is a 82 year old who presents for Preventive Visit.    She overall is feeling well.  She would like to have her cholesterol checked as it has been elevated in the past.    She is due for flu shot and diabetes screening.    She has a history of osteopenia however is not interested in any medications beyond her calcium and vitamin D.  She does stay active.    Patient has been advised of split billing requirements and indicates understanding: Yes  Are you in the first 12 months of your Medicare coverage?  No    Healthy Habits:     In general, how would you rate your overall health?  Excellent    Frequency of exercise:  4-5 days/week    Duration of exercise:  30-45 minutes    Do you usually eat at least 4 servings of fruit and vegetables a day, include whole grains    & fiber and avoid regularly eating high fat or \"junk\" foods?  Yes    Taking medications regularly:  Not Applicable    Medication side effects:  Not applicable    Ability to successfully perform activities of daily living:  No assistance needed    Home Safety:  No safety concerns identified    Hearing Impairment:  No hearing concerns    In the past 6 months, have you been bothered by leaking of urine?  No    In general, how would you rate your overall mental or emotional health?  Excellent      PHQ-2 Total Score: 0    Additional concerns today:  No    Do you feel safe in your environment? Yes    Have you ever done Advance Care Planning? (For example, a Health Directive, POLST, or a discussion with a medical provider or your loved ones about your wishes): Yes, advance care planning is on file.       Fall risk  Fallen 2 or more times in the past year?: No  Any fall with injury in the past year?: No    Cognitive Screening   1) Repeat 3 items (Banana, Sunrise, Chair)    2) Clock draw: NORMAL  3) 3 item recall: Recalls 3 objects  Results: 3 items recalled: COGNITIVE IMPAIRMENT LESS LIKELY    Mini-CogTM Copyright MAJOR Richey. Licensed by the " author for use in [facility]; reprinted with permission ([email or web address]). All rights reserved.      Do you have sleep apnea, excessive snoring or daytime drowsiness?: yes    Reviewed and updated as needed this visit by clinical staff   Tobacco  Allergies  Meds  Problems  Med Hx  Surg Hx  Fam Hx  Soc   Hx        Reviewed and updated as needed this visit by Provider   Tobacco  Allergies  Meds  Problems  Med Hx  Surg Hx  Fam Hx         Social History     Tobacco Use     Smoking status: Never     Passive exposure: Past ( smoked)     Smokeless tobacco: Never   Substance Use Topics     Alcohol use: Yes     Alcohol/week: 0.0 standard drinks     Comment: socially; likes red wine         Alcohol Use 11/2/2022   Prescreen: >3 drinks/day or >7 drinks/week? No     Review current opioid prescriptions    For a patient with a current opioid prescription:    Reviewed potential Opioid Use Disorder (OUD) risk factors: Yes     Evaluate their pain severity and current treatment plan:     Provide information on non-opioid treatment options:    Refer to a specialist, as appropriate:    Get more information on pain management in the [facility] Pain Management Best Practices Inter-agency Task Force Report    Screen for potential Substance Use Disorders (SUDs)    Reviewed the patient s potential risk factors for SUDs: Yes     Refer to treatment or specialist, as appropriate:     A screening tool isn t required but you may use one:  Find more information in the [employer] [place] on Drug Abuse Screening and Assessment Tools Chart    Current providers sharing in care for this patient include:   Patient Care Team:  Riri Messer DO as PCP - General (Internal Medicine)  Riri Messer DO as Assigned PCP    The following health maintenance items are reviewed in Epic and correct as of today:  Health Maintenance   Topic Date Due     HEPATITIS B IMMUNIZATION (1 of 3 - 3-dose series) Never done     COVID-19 Vaccine (2 -  "Booster for Christina series) 05/01/2021     INFLUENZA VACCINE (1) 09/01/2022     MEDICARE ANNUAL WELLNESS VISIT  11/02/2023     FALL RISK ASSESSMENT  11/02/2023     ADVANCE CARE PLANNING  11/02/2027     DTAP/TDAP/TD IMMUNIZATION (2 - Td or Tdap) 12/06/2029     DEXA  12/06/2036     PHQ-2 (once per calendar year)  Completed     Pneumococcal Vaccine: 65+ Years  Completed     ZOSTER IMMUNIZATION  Completed     IPV IMMUNIZATION  Aged Out     MENINGITIS IMMUNIZATION  Aged Out     Mammogram Screening - Patient over age 75, has elected to discontinue screenings.  Does regular self exams at home  Pertinent mammograms are reviewed under the imaging tab.    Review of Systems   Constitutional: Negative for chills and fever.   HENT: Negative for congestion, ear pain, hearing loss and sore throat.    Eyes: Negative for pain and visual disturbance.   Respiratory: Negative for cough and shortness of breath.    Cardiovascular: Negative for chest pain, palpitations and peripheral edema.   Gastrointestinal: Negative for abdominal pain, constipation, diarrhea, heartburn, hematochezia and nausea.   Breasts:  Negative for tenderness, breast mass and discharge.   Genitourinary: Negative for dysuria, frequency, genital sores, hematuria, pelvic pain, urgency, vaginal bleeding and vaginal discharge.   Musculoskeletal: Negative for arthralgias, joint swelling and myalgias.   Skin: Negative for rash.   Neurological: Negative for dizziness, weakness, headaches and paresthesias.   Psychiatric/Behavioral: Negative for mood changes. The patient is not nervous/anxious.        OBJECTIVE:   BP (!) 144/78 (BP Location: Right arm, Patient Position: Sitting, Cuff Size: Adult Regular)   Pulse 70   Temp 97.5  F (36.4  C) (Temporal)   Resp 16   Ht 1.588 m (5' 2.5\")   Wt 55.3 kg (122 lb)   SpO2 98%   BMI 21.96 kg/m   Estimated body mass index is 21.96 kg/m  as calculated from the following:    Height as of this encounter: 1.588 m (5' 2.5\").    Weight " "as of this encounter: 55.3 kg (122 lb).  Physical Exam  GEN: Vitals reviewed. Healthy appearing. Patient is in no acute distress. Cooperative with exam.  HEENT: Normocephalic atraumatic.  Eyes grossly normal to inspection.  No discharge or erythema, or obvious scleral/conjunctival abnormalities.   NECK: Supple; no thyromegaly or masses noted.  No cervical or supraclavicular lymphadenopathy.  CV: Heart regular in rate and rhythm with no murmur.    LUNGS: No audible wheeze, cough, or visible cyanosis.  No visible retractions or increased work of breathing.  Lungs clear to auscultation bilaterally.    ABD:  nondistended  SKIN: Warm and dry to touch.  Visible skin clear. No significant rash, abnormal pigmentation or lesions.  EXT: No clubbing or cyanosis.  No peripheral edema.    Diagnostic Test Results:  Labs reviewed in Epic    ASSESSMENT / PLAN:   1. Encounter for Medicare annual wellness exam    2. Screening for diabetes mellitus  - Comprehensive metabolic panel; Future    3. Screening for hyperlipidemia  - Lipid Profile; Future  -We discussed that based on her ASCVD score of 38.7% (based on age 79) she will qualify for statin medication.  At this time she declines and would like to continue to work on diet and exercise    4. Need for influenza vaccination  Given today    5. Osteopenia of multiple sites  At this time given that she is not interested in any prescription medications I would recommend we wait on additional testing.      Patient has been advised of split billing requirements and indicates understanding: Yes      COUNSELING:  Reviewed preventive health counseling, as reflected in patient instructions    Estimated body mass index is 21.96 kg/m  as calculated from the following:    Height as of this encounter: 1.588 m (5' 2.5\").    Weight as of this encounter: 55.3 kg (122 lb).        She reports that she has never smoked. She has been exposed to tobacco smoke. She has never used smokeless " tobacco.      Appropriate preventive services were discussed with this patient, including applicable screening as appropriate for cardiovascular disease, diabetes, osteopenia/osteoporosis, and glaucoma.  As appropriate for age/gender, discussed screening for colorectal cancer, prostate cancer, breast cancer, and cervical cancer. Checklist reviewing preventive services available has been given to the patient.    Reviewed patients plan of care and provided an AVS. The Basic Care Plan (routine screening as documented in Health Maintenance) for Kiesha meets the Care Plan requirement. This Care Plan has been established and reviewed with the Patient.    Counseling Resources:  ATP IV Guidelines  Pooled Cohorts Equation Calculator  Breast Cancer Risk Calculator  Breast Cancer: Medication to Reduce Risk  FRAX Risk Assessment  ICSI Preventive Guidelines  Dietary Guidelines for Americans, 2010  USDA's MyPlate  ASA Prophylaxis  Lung CA Screening    Rrii Messer DO  [facility]    Identified Health Risks:

## 2022-11-02 NOTE — NURSING NOTE
Patient presents to clinic today for annual Medicare wellness visit.  Medication review completed.    ACP on file? yes    FOOD SECURITY SCREENING QUESTIONS    The next two questions are to help us understand your food security.  If you are feeling you need any assistance in this area, we have resources available to support you today.    Hunger Vital Signs:   Within the past 12 months we worried whether our food would run out before we got money to buy more. Never  Within the past 12 months the food we bought just didn't last and we didn't have money to get more. Never    Sarah Mohan CMA([facility])..................11/2/2022   10:10 AM     Immunization Documentation    Prior to Fluzone HD Immunization administration, verified patients identity using patient's name and date of birth. Please see IMMUNIZATIONS  and order for additional information.  Patient / Parent instructed to remain in clinic for 15 minutes and report any adverse reaction to staff immediately.    Was entire vial of medication used? Yes  Vial/Syringe: Syringe

## 2023-05-20 ENCOUNTER — OFFICE VISIT (OUTPATIENT)
Dept: FAMILY MEDICINE | Facility: OTHER | Age: 83
End: 2023-05-20
Payer: MEDICARE

## 2023-05-20 VITALS
BODY MASS INDEX: 23.33 KG/M2 | RESPIRATION RATE: 18 BRPM | HEIGHT: 61 IN | SYSTOLIC BLOOD PRESSURE: 112 MMHG | WEIGHT: 123.6 LBS | TEMPERATURE: 99 F | DIASTOLIC BLOOD PRESSURE: 62 MMHG | HEART RATE: 87 BPM | OXYGEN SATURATION: 96 %

## 2023-05-20 DIAGNOSIS — L02.519 CELLULITIS AND ABSCESS OF HAND: Primary | ICD-10-CM

## 2023-05-20 DIAGNOSIS — L03.119 CELLULITIS AND ABSCESS OF HAND: Primary | ICD-10-CM

## 2023-05-20 PROCEDURE — G0463 HOSPITAL OUTPT CLINIC VISIT: HCPCS

## 2023-05-20 PROCEDURE — 99202 OFFICE O/P NEW SF 15 MIN: CPT | Performed by: FAMILY MEDICINE

## 2023-05-20 RX ORDER — SULFAMETHOXAZOLE/TRIMETHOPRIM 800-160 MG
1 TABLET ORAL 2 TIMES DAILY
Qty: 14 TABLET | Refills: 0 | Status: SHIPPED | OUTPATIENT
Start: 2023-05-20 | End: 2023-05-27

## 2023-05-20 ASSESSMENT — PAIN SCALES - GENERAL: PAINLEVEL: MILD PAIN (2)

## 2023-05-20 NOTE — NURSING NOTE
Chief Complaint   Patient presents with     Laceration     Right thumb      Patient presents to the clinic for cut to right thumb, she cut it on a can opener 2 days ago. Patient soaked cut she is unsure of what she soaked it in she said when she soaked it yesterday she stated a lot of pus came out of cut. Patient took ibuprofen for the pain this morning.     Bebe Joel LPN       FOOD SECURITY SCREENING QUESTIONS:    The next two questions are to help us understand your food security.  If you are feeling you need any assistance in this area, we have resources available to support you today.    Hunger Vital Signs:  Within the past 12 months we worried whether our food would run out before we got money to buy more. Never  Within the past 12 months the food we bought just didn't last and we didn't have money to get more. Never  Bebe Joel LPN,LPN on 5/20/2023 at 3:50 PM   Food Insecurity: Not on file

## 2023-05-20 NOTE — PROGRESS NOTES
"  (L03.119,  L02.519) Cellulitis and abscess of hand  (primary encounter diagnosis)  Comment:     Superficial abscess distal portion right thumb, partially treated by patient.    Plan: sulfamethoxazole-trimethoprim (BACTRIM DS)         800-160 MG tablet        Soaks and bandaging to continue.  Observe with follow-up if worsening or persistent.      HISTORY    She accidentally punctured her thumb with a can opener.  Then a day or so later developed a blister which she says had pus in it.  She drained it with a pin.  She is continue to soak the thumb several times daily and keep it bandaged and put ointment on it.    She notices quite a large blister right now without drainage and not a lot of tenderness.    [place] so is concerned to have it checked.      REVIEW OF SYSTEMS    Unremarkable except as above.      EXAM  /62 (BP Location: Right arm, Patient Position: Sitting, Cuff Size: Adult Regular)   Pulse 87   Temp 99  F (37.2  C) (Tympanic)   Resp 18   Ht 1.56 m (5' 1.42\")   Wt 56.1 kg (123 lb 9.6 oz)   SpO2 96%   BMI 23.04 kg/m      Exam shows about a 1 x 2 cm vesicle kind of in a resolving stage.  There is minimal surrounding redness of the pad area of the thumb.  No lymphangitis.    I did prepped the area with alcohol and made a couple of nicks in the vesicle but no purulent drainage obtained.      "

## 2023-12-04 ENCOUNTER — OFFICE VISIT (OUTPATIENT)
Dept: INTERNAL MEDICINE | Facility: OTHER | Age: 83
End: 2023-12-04
Attending: INTERNAL MEDICINE
Payer: MEDICARE

## 2023-12-04 VITALS
WEIGHT: 122.25 LBS | TEMPERATURE: 99.4 F | RESPIRATION RATE: 16 BRPM | HEIGHT: 62 IN | HEART RATE: 75 BPM | BODY MASS INDEX: 22.5 KG/M2 | OXYGEN SATURATION: 95 % | SYSTOLIC BLOOD PRESSURE: 138 MMHG | DIASTOLIC BLOOD PRESSURE: 84 MMHG

## 2023-12-04 DIAGNOSIS — M19.041 OA (OSTEOARTHRITIS) OF FINGER, RIGHT: ICD-10-CM

## 2023-12-04 DIAGNOSIS — Z23 NEED FOR INFLUENZA VACCINATION: ICD-10-CM

## 2023-12-04 DIAGNOSIS — Z00.00 ENCOUNTER FOR MEDICARE ANNUAL WELLNESS EXAM: Primary | ICD-10-CM

## 2023-12-04 DIAGNOSIS — R01.1 HEART MURMUR: ICD-10-CM

## 2023-12-04 DIAGNOSIS — Z13.1 SCREENING FOR DIABETES MELLITUS: ICD-10-CM

## 2023-12-04 DIAGNOSIS — M85.89 OSTEOPENIA OF MULTIPLE SITES: ICD-10-CM

## 2023-12-04 DIAGNOSIS — I34.1 MVP (MITRAL VALVE PROLAPSE): ICD-10-CM

## 2023-12-04 DIAGNOSIS — R22.1 NECK MASS: ICD-10-CM

## 2023-12-04 DIAGNOSIS — Z13.220 SCREENING FOR HYPERLIPIDEMIA: ICD-10-CM

## 2023-12-04 LAB
ALBUMIN SERPL BCG-MCNC: 4.7 G/DL (ref 3.5–5.2)
ALP SERPL-CCNC: 63 U/L (ref 40–150)
ALT SERPL W P-5'-P-CCNC: 24 U/L (ref 0–50)
ANION GAP SERPL CALCULATED.3IONS-SCNC: 9 MMOL/L (ref 7–15)
AST SERPL W P-5'-P-CCNC: 27 U/L (ref 0–45)
BILIRUB SERPL-MCNC: 0.6 MG/DL
BUN SERPL-MCNC: 9.8 MG/DL (ref 8–23)
CALCIUM SERPL-MCNC: 9.6 MG/DL (ref 8.8–10.2)
CHLORIDE SERPL-SCNC: 97 MMOL/L (ref 98–107)
CHOLEST SERPL-MCNC: 199 MG/DL
CREAT SERPL-MCNC: 0.76 MG/DL (ref 0.51–0.95)
DEPRECATED HCO3 PLAS-SCNC: 27 MMOL/L (ref 22–29)
EGFRCR SERPLBLD CKD-EPI 2021: 77 ML/MIN/1.73M2
ERYTHROCYTE [DISTWIDTH] IN BLOOD BY AUTOMATED COUNT: 13.8 % (ref 10–15)
GLUCOSE SERPL-MCNC: 95 MG/DL (ref 70–99)
HCT VFR BLD AUTO: 39.5 % (ref 35–47)
HDLC SERPL-MCNC: 77 MG/DL
HGB BLD-MCNC: 13 G/DL (ref 11.7–15.7)
LDLC SERPL CALC-MCNC: 106 MG/DL
MCH RBC QN AUTO: 28.6 PG (ref 26.5–33)
MCHC RBC AUTO-ENTMCNC: 32.9 G/DL (ref 31.5–36.5)
MCV RBC AUTO: 87 FL (ref 78–100)
NONHDLC SERPL-MCNC: 122 MG/DL
PLATELET # BLD AUTO: 170 10E3/UL (ref 150–450)
POTASSIUM SERPL-SCNC: 3.9 MMOL/L (ref 3.4–5.3)
PROT SERPL-MCNC: 7 G/DL (ref 6.4–8.3)
RBC # BLD AUTO: 4.54 10E6/UL (ref 3.8–5.2)
SODIUM SERPL-SCNC: 133 MMOL/L (ref 135–145)
TRIGL SERPL-MCNC: 80 MG/DL
TSH SERPL DL<=0.005 MIU/L-ACNC: 1.91 UIU/ML (ref 0.3–4.2)
WBC # BLD AUTO: 5 10E3/UL (ref 4–11)

## 2023-12-04 PROCEDURE — G0008 ADMIN INFLUENZA VIRUS VAC: HCPCS

## 2023-12-04 PROCEDURE — G0439 PPPS, SUBSEQ VISIT: HCPCS | Performed by: INTERNAL MEDICINE

## 2023-12-04 PROCEDURE — 80061 LIPID PANEL: CPT | Mod: ZL | Performed by: INTERNAL MEDICINE

## 2023-12-04 PROCEDURE — 99214 OFFICE O/P EST MOD 30 MIN: CPT | Mod: 25 | Performed by: INTERNAL MEDICINE

## 2023-12-04 PROCEDURE — 36415 COLL VENOUS BLD VENIPUNCTURE: CPT | Mod: ZL | Performed by: INTERNAL MEDICINE

## 2023-12-04 PROCEDURE — 85027 COMPLETE CBC AUTOMATED: CPT | Mod: ZL | Performed by: INTERNAL MEDICINE

## 2023-12-04 PROCEDURE — G0463 HOSPITAL OUTPT CLINIC VISIT: HCPCS | Mod: 25

## 2023-12-04 PROCEDURE — 80053 COMPREHEN METABOLIC PANEL: CPT | Mod: ZL | Performed by: INTERNAL MEDICINE

## 2023-12-04 PROCEDURE — 84443 ASSAY THYROID STIM HORMONE: CPT | Mod: ZL,GZ | Performed by: INTERNAL MEDICINE

## 2023-12-04 ASSESSMENT — ENCOUNTER SYMPTOMS
HEADACHES: 0
ARTHRALGIAS: 1
MYALGIAS: 0
DYSURIA: 0
PALPITATIONS: 0
DIZZINESS: 0
HEMATOCHEZIA: 0
HEARTBURN: 0
WEAKNESS: 0
JOINT SWELLING: 1
FEVER: 0
CHILLS: 0
SHORTNESS OF BREATH: 0
NERVOUS/ANXIOUS: 0
CONSTIPATION: 0
SORE THROAT: 0
ADENOPATHY: 0
BREAST MASS: 0
PARESTHESIAS: 0
ABDOMINAL PAIN: 0
HEMATURIA: 0
COUGH: 0
DIARRHEA: 0
NAUSEA: 0
FREQUENCY: 0
EYE PAIN: 0

## 2023-12-04 ASSESSMENT — ACTIVITIES OF DAILY LIVING (ADL): CURRENT_FUNCTION: NO ASSISTANCE NEEDED

## 2023-12-04 ASSESSMENT — PAIN SCALES - GENERAL: PAINLEVEL: NO PAIN (0)

## 2023-12-04 NOTE — PROGRESS NOTES
"SUBJECTIVE:   Kiesha is a 83 year old, presenting for the following:  Medicare Visit        12/4/2023     9:40 AM   Additional Questions   Roomed by KERRI Ding   Accompanied by self       Are you in the first 12 months of your Medicare coverage?  No    Healthy Habits:     In general, how would you rate your overall health?  Excellent    Frequency of exercise:  4-5 days/week    Duration of exercise:  30-45 minutes    Do you usually eat at least 4 servings of fruit and vegetables a day, include whole grains    & fiber and avoid regularly eating high fat or \"junk\" foods?  Yes    Taking medications regularly:  Yes    Medication side effects:  Not applicable    Ability to successfully perform activities of daily living:  No assistance needed    Home Safety:  No safety concerns identified    Hearing Impairment:  No hearing concerns    In the past 6 months, have you been bothered by leaking of urine?  No    In general, how would you rate your overall mental or emotional health?  Good    Additional concerns today:  No    Patient returns today overall feeling well.  She denies any new concerns.  She is trying to stay social and active after the loss of her .  She has had some increased joint pain with her osteoarthritis.  This is primarily in her PIP joints on the right hand.  She has had a little bit of swelling with this.    She has a history of osteopenia.  She had a DEXA scan in 2021.  She is no longer pursuing mammograms.    She is due for her flu shot.  She is also due for diabetes and cholesterol screening.      Today's PHQ-2 Score:       12/4/2023     9:35 AM   PHQ-2 ( 1999 [employer])   Q1: Little interest or pleasure in doing things 0   Q2: Feeling down, depressed or hopeless 0   PHQ-2 Score 0   Q1: Little interest or pleasure in doing things Not at all   Q2: Feeling down, depressed or hopeless Not at all   PHQ-2 Score 0     Have you ever done Advance Care Planning? (For example, a Health Directive, POLST, or " a discussion with a medical provider or your loved ones about your wishes): Yes, advance care planning is on file.       Fall risk  Fallen 2 or more times in the past year?: No  Any fall with injury in the past year?: No    Cognitive Screening   1) Repeat 3 items (Leader, Season, Table)    2) Clock draw: NORMAL  3) 3 item recall: Recalls 3 objects  Results: 3 items recalled: COGNITIVE IMPAIRMENT LESS LIKELY    Mini-CogTM Copyright MAJOR Richey. Licensed by the author for use in [facility]; reprinted with permission ([email or web address]). All rights reserved.      Do you have sleep apnea, excessive snoring or daytime drowsiness? : no    Reviewed and updated as needed this visit by clinical staff   Tobacco  Allergies  Meds  Problems  Med Hx  Surg Hx  Fam Hx  Soc   Hx        Reviewed and updated as needed this visit by Provider   Tobacco  Allergies  Meds  Problems  Med Hx  Surg Hx  Fam Hx         Social History     Tobacco Use    Smoking status: Never     Passive exposure: Past ( smoked)    Smokeless tobacco: Never   Substance Use Topics    Alcohol use: Yes     Alcohol/week: 0.0 standard drinks of alcohol     Comment: socially; likes red wine           12/4/2023     9:35 AM   Alcohol Use   Prescreen: >3 drinks/day or >7 drinks/week? No     Do you have a current opioid prescription? No  Do you use any other controlled substances or medications that are not prescribed by a provider? None    Current providers sharing in care for this patient include:   Patient Care Team:  Riri Messer DO as PCP - General (Internal Medicine)  Riri Messer DO as Assigned PCP    The following health maintenance items are reviewed in Epic and correct as of today:  Health Maintenance   Topic Date Due    RSV VACCINE (Pregnancy & 60+) (1 - 1-dose 60+ series) Never done    INFLUENZA VACCINE (1) 09/01/2023    MEDICARE ANNUAL WELLNESS VISIT  12/04/2024    FALL RISK ASSESSMENT  12/04/2024    ADVANCE CARE PLANNING   "12/04/2028    DTAP/TDAP/TD IMMUNIZATION (2 - Td or Tdap) 12/06/2029    DEXA  12/06/2036    PHQ-2 (once per calendar year)  Completed    Pneumococcal Vaccine: 65+ Years  Completed    ZOSTER IMMUNIZATION  Completed    IPV IMMUNIZATION  Aged Out    HPV IMMUNIZATION  Aged Out    MENINGITIS IMMUNIZATION  Aged Out    RSV MONOCLONAL ANTIBODY  Aged Out    COVID-19 Vaccine  Discontinued       Mammogram Screening - Patient over age 75, has elected to discontinue screenings.  Pertinent mammograms are reviewed under the imaging tab.    Current Outpatient Medications   Medication    biotin 5 MG CAPS    calcium carbonate-vitamin D (OSCAL W/D) 500-200 MG-UNIT tablet    Misc Natural Products (GLUCOSAMINE CHOND MSM FORMULA PO)    Multiple Vitamin (MULTI-VITAMINS) TABS     No current facility-administered medications for this visit.       Review of Systems   Constitutional:  Negative for chills and fever.   HENT:  Negative for congestion, ear pain, hearing loss and sore throat.    Eyes:  Negative for pain and visual disturbance.   Respiratory:  Negative for cough and shortness of breath.    Cardiovascular:  Negative for chest pain, palpitations and peripheral edema.   Gastrointestinal:  Negative for abdominal pain, constipation, diarrhea, heartburn, hematochezia and nausea.   Breasts:  Negative for tenderness, breast mass and discharge.   Genitourinary:  Negative for dysuria, frequency, genital sores, hematuria, pelvic pain, urgency, vaginal bleeding and vaginal discharge.   Musculoskeletal:  Positive for arthralgias and joint swelling. Negative for myalgias.   Skin:  Negative for rash.   Neurological:  Negative for dizziness, weakness, headaches and paresthesias.   Hematological:  Negative for adenopathy.   Psychiatric/Behavioral:  Negative for mood changes. The patient is not nervous/anxious.        OBJECTIVE:   /84   Pulse 75   Temp 99.4  F (37.4  C) (Tympanic)   Resp 16   Ht 1.575 m (5' 2\")   Wt 55.5 kg (122 lb 4 oz)  " " SpO2 95%   BMI 22.36 kg/m   Estimated body mass index is 22.36 kg/m  as calculated from the following:    Height as of this encounter: 1.575 m (5' 2\").    Weight as of this encounter: 55.5 kg (122 lb 4 oz).  Physical Exam  GEN: Vitals reviewed. Healthy appearing. Patient is in no acute distress. Cooperative with exam.  HEENT: Normocephalic atraumatic.  Eyes grossly normal to inspection.  No discharge or erythema, or obvious scleral/conjunctival abnormalities. Oropharynx with no erythema or exudates. Dentition adequate.    NECK: Supple; enlargement of base of right thyroid.  No cervical or supraclavicular lymphadenopathy.  CV: Heart regular in rate and rhythm with 2/6 diastolic murmur.    LUNGS: No audible wheeze, cough, or visible cyanosis.  No visible retractions or increased work of breathing.  Lungs clear to auscultation bilaterally.    ABD:  Nondistended  SKIN: Warm and dry to touch.  Visible skin clear. No significant rash, abnormal pigmentation or lesions.  EXT: No clubbing or cyanosis.  No peripheral edema.  NEURO: Alert and oriented to person, place, and time.  Cranial nerves II-XII grossly intact with no focal or lateralizing deficits.  Muscle tone normal.  Gait normal. No tremor.   MSK: ROM of upper and lower ext symmetric and full.  PSYCH: Mood is good.  Mentation appears normal, affect normal/bright, judgement and insight intact, normal speech and appearance well-groomed.      Diagnostic Test Results:  Labs reviewed in Epic    ASSESSMENT / PLAN:   1. Encounter for Medicare annual wellness exam    2. Screening for diabetes mellitus  - Comprehensive Metabolic Panel; Future    3. Screening for hyperlipidemia  - Lipid Panel; Future    4. Osteopenia of multiple sites  For repeat DEXA scan in 1 to 2 years.  - CBC W PLT No Diff; Future    5. Heart murmur  New onset heart murmur today.  We will obtain echocardiogram.  No indication of CHF at this time.  She is to monitor for any symptoms.  - Echocardiogram " Complete; Future  - TSH Reflex GH; Future    6. Neck mass  With enlargement of what is likely the right thyroid we will obtain an ultrasound along with lab testing.  She will be contacted with results.  - TSH Reflex GH; Future  - US Thyroid; Future    7. Need for influenza vaccination  - INFLUENZA VACCINE 65+ (FLUZONE HD)    8. OA (osteoarthritis) of finger, right  Encouraged to use Tylenol arthritis up to 3 times daily, and/or topical agents such as Voltaren gel or Aspercreme and call with ongoing issues      Patient has been advised of split billing requirements and indicates understanding: Yes      COUNSELING:  Reviewed preventive health counseling, as reflected in patient instructions        She reports that she has never smoked. She has been exposed to tobacco smoke. She has never used smokeless tobacco.      Appropriate preventive services were discussed with this patient, including applicable screening as appropriate for fall prevention, nutrition, physical activity, Tobacco-use cessation, weight loss and cognition.  Checklist reviewing preventive services available has been given to the patient.    Reviewed patients plan of care and provided an AVS. The Basic Care Plan (routine screening as documented in Health Maintenance) for Kiesha meets the Care Plan requirement. This Care Plan has been established and reviewed with the Patient.      Riri Messer,   [facility]    Identified Health Risks:  I have reviewed Opioid Use Disorder and Substance Use Disorder risk factors and made any needed referrals.

## 2023-12-04 NOTE — NURSING NOTE
"Chief Complaint   Patient presents with    Medicare Visit       Initial /84   Pulse 75   Temp 99.4  F (37.4  C) (Tympanic)   Resp 16   Ht 1.575 m (5' 2\")   Wt 55.5 kg (122 lb 4 oz)   SpO2 95%   BMI 22.36 kg/m   Estimated body mass index is 22.36 kg/m  as calculated from the following:    Height as of this encounter: 1.575 m (5' 2\").    Weight as of this encounter: 55.5 kg (122 lb 4 oz).  Medication Review: complete    The next two questions are to help us understand your food security.  If you are feeling you need any assistance in this area, we have resources available to support you today.          12/4/2023   SDOH- Food Insecurity   Within the past 12 months, did you worry that your food would run out before you got money to buy more? N   Within the past 12 months, did the food you bought just not last and you didn t have money to get more? N         Health Care Directive:  Patient has a Health Care Directive on file      Toña Zhu LPN      "

## 2023-12-04 NOTE — PATIENT INSTRUCTIONS
Patient Education   Personalized Prevention Plan  You are due for the preventive services outlined below.  Your care team is available to assist you in scheduling these services.  If you have already completed any of these items, please share that information with your care team to update in your medical record.  Health Maintenance Due   Topic Date Due    RSV VACCINE (Pregnancy & 60+) (1 - 1-dose 60+ series) Never done    Flu Vaccine (1) 09/01/2023    COVID-19 Vaccine (2 - 2023-24 season) 09/01/2023

## 2023-12-05 ENCOUNTER — HOSPITAL ENCOUNTER (OUTPATIENT)
Dept: ULTRASOUND IMAGING | Facility: OTHER | Age: 83
Discharge: HOME | End: 2023-12-05
Attending: INTERNAL MEDICINE | Admitting: INTERNAL MEDICINE
Payer: MEDICARE

## 2023-12-05 DIAGNOSIS — R22.1 NECK MASS: ICD-10-CM

## 2023-12-05 PROCEDURE — 76536 US EXAM OF HEAD AND NECK: CPT

## 2023-12-12 ENCOUNTER — HOSPITAL ENCOUNTER (OUTPATIENT)
Dept: CARDIOLOGY | Facility: OTHER | Age: 83
Discharge: HOME | End: 2023-12-12
Attending: INTERNAL MEDICINE | Admitting: INTERNAL MEDICINE
Payer: MEDICARE

## 2023-12-12 DIAGNOSIS — R01.1 HEART MURMUR: ICD-10-CM

## 2023-12-12 LAB — LVEF ECHO: NORMAL

## 2023-12-12 PROCEDURE — 93306 TTE W/DOPPLER COMPLETE: CPT

## 2023-12-12 PROCEDURE — 93306 TTE W/DOPPLER COMPLETE: CPT | Mod: 26 | Performed by: STUDENT IN AN ORGANIZED HEALTH CARE EDUCATION/TRAINING PROGRAM

## 2023-12-13 NOTE — RESULT ENCOUNTER NOTE
Results released to my chart Quality 431: Preventive Care And Screening: Unhealthy Alcohol Use - Screening: Patient screened for unhealthy alcohol use using a single question and scores less than 2 times per year Quality 226: Preventive Care And Screening: Tobacco Use: Screening And Cessation Intervention: Patient screened for tobacco use and is an ex/non-smoker Detail Level: Detailed

## 2023-12-21 ENCOUNTER — OFFICE VISIT (OUTPATIENT)
Dept: CARDIOLOGY | Facility: OTHER | Age: 83
End: 2023-12-21
Attending: NURSE PRACTITIONER
Payer: MEDICARE

## 2023-12-21 VITALS
OXYGEN SATURATION: 98 % | RESPIRATION RATE: 12 BRPM | WEIGHT: 119.2 LBS | DIASTOLIC BLOOD PRESSURE: 70 MMHG | HEIGHT: 62 IN | TEMPERATURE: 97.7 F | BODY MASS INDEX: 21.94 KG/M2 | SYSTOLIC BLOOD PRESSURE: 120 MMHG | HEART RATE: 78 BPM

## 2023-12-21 DIAGNOSIS — I34.0 NONRHEUMATIC MITRAL VALVE REGURGITATION: ICD-10-CM

## 2023-12-21 DIAGNOSIS — I34.1 MVP (MITRAL VALVE PROLAPSE): Primary | ICD-10-CM

## 2023-12-21 LAB
ATRIAL RATE - MUSE: 76 BPM
DIASTOLIC BLOOD PRESSURE - MUSE: NORMAL MMHG
INTERPRETATION ECG - MUSE: NORMAL
P AXIS - MUSE: 36 DEGREES
PR INTERVAL - MUSE: 148 MS
QRS DURATION - MUSE: 66 MS
QT - MUSE: 372 MS
QTC - MUSE: 418 MS
R AXIS - MUSE: -21 DEGREES
SYSTOLIC BLOOD PRESSURE - MUSE: NORMAL MMHG
T AXIS - MUSE: 47 DEGREES
VENTRICULAR RATE- MUSE: 76 BPM

## 2023-12-21 PROCEDURE — 93005 ELECTROCARDIOGRAM TRACING: CPT | Performed by: NURSE PRACTITIONER

## 2023-12-21 PROCEDURE — G0463 HOSPITAL OUTPT CLINIC VISIT: HCPCS | Mod: 25

## 2023-12-21 PROCEDURE — G0463 HOSPITAL OUTPT CLINIC VISIT: HCPCS

## 2023-12-21 PROCEDURE — 99204 OFFICE O/P NEW MOD 45 MIN: CPT | Performed by: NURSE PRACTITIONER

## 2023-12-21 PROCEDURE — 93010 ELECTROCARDIOGRAM REPORT: CPT | Performed by: INTERNAL MEDICINE

## 2023-12-21 ASSESSMENT — PAIN SCALES - GENERAL: PAINLEVEL: NO PAIN (0)

## 2023-12-21 NOTE — NURSING NOTE
"Chief Complaint   Patient presents with    Referral     Dr Messre 12/4/2023 Heart Murmer-Mitral Valve Prolapse     Results     Echo completed on 12/12/2023        Initial /70 (BP Location: Right arm, Patient Position: Sitting, Cuff Size: Adult Regular)   Pulse 78   Temp 97.7  F (36.5  C) (Temporal)   Resp 12   Ht 1.575 m (5' 2\")   Wt 54.1 kg (119 lb 3.2 oz)   SpO2 98%   BMI 21.80 kg/m   Estimated body mass index is 21.8 kg/m  as calculated from the following:    Height as of this encounter: 1.575 m (5' 2\").    Weight as of this encounter: 54.1 kg (119 lb 3.2 oz).  Meds Reconciled: complete  Pt is not on Aspirin  Pt is not on a Statin  Pt is not on Xarelto or Eliquis  Pt is not on a Warfarin   PHQ and/or CHAD reviewed. Pt referred to PCP/MH Provider as appropriate.    Enriqueta Kaufman LPN         "

## 2023-12-21 NOTE — PATIENT INSTRUCTIONS
Repeat echocardiogram in 6 months.   Follow-up with cardiology in about 7 months, following echocardiogram.

## 2023-12-21 NOTE — PROGRESS NOTES
[facility] HEART CARE   CARDIOLOGY CONSULT     Kiesha Garner   [address] [id number]    Riri Messer     Chief Complaint   Patient presents with    Referral     Dr Messer 12/4/2023 Heart Murmer-Mitral Valve Prolapse     Results     Echo completed on 12/12/2023         HPI:   MRS. Garner is an 83 year old female who presents for cardiology evaluation with identified cardiac murmur, TTE revealing MVP with mild to severe MR. Patient is in great overall health. PMH limited to mild HLD diet controlled, osteopenia and seasonal allergies.     Patient admits to feeling very good. She exercises at the [employer] three days a week without difficulty or limitation. No chest pain or pressure, no increased dyspnea at rest or with exertion, no palpitations, no edema or weight gain and no lightheadedness or syncope.     RELEVANT TESTING REVIEWED:  Echocardiogram 12/12/2023  Interpretation Summary  Global and regional left ventricular function is normal with an EF of 55-60%.  Mild concentric wall thickening consistent with left ventricular hypertrophy  is present.  Global right ventricular function is normal.  Moderate to severe mitral insufficiency is present.  The inferior vena cava was normal in size with preserved respiratory  variability.  No pericardial effusion is present.      PAST MEDICAL HISTORY:   Past Medical History:   Diagnosis Date    Cataract     Duodenal ulcer without hemorrhage or perforation 1999    due to H pylori    Elevated antinuclear antibody (MARÍA) level 7/7/2018    Hyperlipidemia 10/24/2012    diet controlled    Osteopenia 04/10/2012    Other seasonal allergic rhinitis     Palpitations     only with increased alcohol          FAMILY HISTORY:   Family History   Problem Relation Age of Onset    Other - See Comments Mother         Memory loss    Other - See Comments Father         Pneumonia and alcohol related illness    Other - See Comments Sister         Takatsubo cardiomyopathy    Dementia  Sister     No Known Problems Son     Colon Cancer Paternal Aunt     Genetic Disorder Other         Genetic,No Fhx of DM, CAD    Breast Cancer No family hx of         Cancer-breast          PAST SURGICAL HISTORY:   Past Surgical History:   Procedure Laterality Date    COLONOSCOPY  09/30/2016    normal; repeat 10 years if indicated    EXTRACAPSULAR CATARACT EXTRATION WITH INTRAOCULAR LENS IMPLANT      HYSTERECTOMY TOTAL ABDOMINAL  1984    Dr. Donnelly; for fibroids          SOCIAL HISTORY:   Social History     Socioeconomic History    Marital status:      Spouse name: Neptali    Number of children: None    Years of education: None    Highest education level: None   Tobacco Use    Smoking status: Never     Passive exposure: Past ( smoked)    Smokeless tobacco: Never   Vaping Use    Vaping Use: Never used   Substance and Sexual Activity    Alcohol use: Yes     Alcohol/week: 0.0 standard drinks of alcohol     Comment: socially; likes red wine    Drug use: Never     Comment:      Sexual activity: Not Currently     Partners: Male   Social History Narrative    ,  Neptali. 1 son.   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   Then ran the [employer] on [address] for about 6 years.   Then worked at [employer].   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, taught for many years with [employer]     Social Determinants of Health     Financial Resource Strain: Low Risk  (12/4/2023)    Financial Resource Strain     Within the past 12 months, have you or your family members you live with been unable to get utilities (heat, electricity) when it was really needed?: No   Food Insecurity: Low Risk  (12/4/2023)    Food Insecurity     Within the past 12 months, did you worry that your food would run out before you got money to buy more?: No     Within the past 12 months, did the food you bought just not last and you didn t have money to get more?: No   Transportation Needs: Low Risk  (12/4/2023)     Transportation Needs     Within the past 12 months, has lack of transportation kept you from medical appointments, getting your medicines, non-medical meetings or appointments, work, or from getting things that you need?: No   Interpersonal Safety: Low Risk  (12/21/2023)    Interpersonal Safety     Do you feel physically and emotionally safe where you currently live?: Yes     Within the past 12 months, have you been hit, slapped, kicked or otherwise physically hurt by someone?: No     Within the past 12 months, have you been humiliated or emotionally abused in other ways by your partner or ex-partner?: No   Housing Stability: Low Risk  (12/4/2023)    Housing Stability     Do you have housing? : Yes     Are you worried about losing your housing?: No          CURRENT MEDICATIONS:   Prior to Admission medications    Medication Sig Start Date End Date Taking? Authorizing Provider   biotin 5 MG CAPS Take 5 mg by mouth daily 10/23/15  Yes Reported, Patient   calcium carbonate-vitamin D (OSCAL W/D) 500-200 MG-UNIT tablet Take 1-2 tablets by mouth daily   Yes Reported, Patient   Misc Natural Products (GLUCOSAMINE CHOND MSM FORMULA PO) Take 1 tablet by mouth daily   Yes Reported, Patient   Multiple Vitamin (MULTI-VITAMINS) TABS Take 1 tablet by mouth daily   Yes Reported, Patient          ALLERGIES:   No Known Allergies       ROS:   CONSTITUTIONAL: No reported fever or chills. No changes in weight.  ENT: No visual disturbance, ear ache, epistaxis or sore throat.   CARDIOVASCULAR: No chest pain, chest pressure or chest discomfort. No palpitations or lower extremity edema.   RESPIRATORY: No shortness of breath, dyspnea upon exertion, cough, wheezing or hemoptysis. No orthopnea or PND.   GI: No reported abdominal pain.   : No reported hematuria or dysuria.   NEUROLOGICAL: No lightheadedness, dizziness, syncope, ataxia, paresthesias or weakness.   HEMATOLOGIC: No history of anemia. No bleeding or excessive bruising. No history  "of blood clots.   MUSCULOSKELETAL: No new joint pain or swelling, no muscle pain.  ENDOCRINOLOGIC: No temperature intolerance. No hair or skin changes.  SKIN: No abnormal rashes or sores, no unusual itching.  PSYCHIATRIC: No history of depression or anxiety. No changes in mood, feeling down or anxious. No changes in sleep.      PHYSICAL EXAM:   /70 (BP Location: Right arm, Patient Position: Sitting, Cuff Size: Adult Regular)   Pulse 78   Temp 97.7  F (36.5  C) (Temporal)   Resp 12   Ht 1.575 m (5' 2\")   Wt 54.1 kg (119 lb 3.2 oz)   SpO2 98%   BMI 21.80 kg/m    GENERAL: The patient is a well-developed, well-nourished, in no apparent distress.  HEENT: Head is normocephalic and atraumatic. Eyes are symmetrical with normal visual tracking. No icterus, no xanthelasmas. Nares appeared normal without nasal drainage. Mucous membranes are moist, no cyanosis.  NECK: Supple, no cervical bruits, JVP not visible.   CHEST/ LUNGS: Lungs clear to auscultation, no rales, rhonchi or wheezes, no use of accessory muscles, no retractions, respirations unlabored and normal respiratory rate.   CARDIO: Regular rate and rhythm normal with S1 and S2, positive for grade III holosystolic murmur, no click or rub.   ABD: Abdomen is nondistended.   EXTREMITIES: No edema present.   MUSCULOSKELETAL: No visible joint swelling.   NEUROLOGIC: Alert and oriented X3. Normal speech, gait and affect. No focal neurologic deficits.   SKIN: No jaundice. No rashes or visible skin lesions present. No ecchymosis.     EKG:    SR with occasional PVC's, rate 76 bpm    LAB RESULTS:   Office Visit on 12/04/2023   Component Date Value Ref Range Status    Sodium 12/04/2023 133 (L)  135 - 145 mmol/L Final    Potassium 12/04/2023 3.9  3.4 - 5.3 mmol/L Final    Carbon Dioxide (CO2) 12/04/2023 27  22 - 29 mmol/L Final    Anion Gap 12/04/2023 9  7 - 15 mmol/L Final    Urea Nitrogen 12/04/2023 9.8  8.0 - 23.0 mg/dL Final    Creatinine 12/04/2023 0.76  0.51 - " 0.95 mg/dL Final    GFR Estimate 12/04/2023 77  >60 mL/min/1.73m2 Final    Calcium 12/04/2023 9.6  8.8 - 10.2 mg/dL Final    Chloride 12/04/2023 97 (L)  98 - 107 mmol/L Final    Glucose 12/04/2023 95  70 - 99 mg/dL Final    Alkaline Phosphatase 12/04/2023 63  40 - 150 U/L Final    AST 12/04/2023 27  0 - 45 U/L Final    ALT 12/04/2023 24  0 - 50 U/L Final    Protein Total 12/04/2023 7.0  6.4 - 8.3 g/dL Final    Albumin 12/04/2023 4.7  3.5 - 5.2 g/dL Final    Bilirubin Total 12/04/2023 0.6  <=1.2 mg/dL Final    WBC Count 12/04/2023 5.0  4.0 - 11.0 10e3/uL Final    RBC Count 12/04/2023 4.54  3.80 - 5.20 10e6/uL Final    Hemoglobin 12/04/2023 13.0  11.7 - 15.7 g/dL Final    Hematocrit 12/04/2023 39.5  35.0 - 47.0 % Final    MCV 12/04/2023 87  78 - 100 fL Final    MCH 12/04/2023 28.6  26.5 - 33.0 pg Final    MCHC 12/04/2023 32.9  31.5 - 36.5 g/dL Final    RDW 12/04/2023 13.8  10.0 - 15.0 % Final    Platelet Count 12/04/2023 170  150 - 450 10e3/uL Final    Cholesterol 12/04/2023 199  <200 mg/dL Final    Triglycerides 12/04/2023 80  <150 mg/dL Final    Direct Measure HDL 12/04/2023 77  >=50 mg/dL Final    LDL Cholesterol Calculated 12/04/2023 106 (H)  <=100 mg/dL Final    Non HDL Cholesterol 12/04/2023 122  <130 mg/dL Final    TSH 12/04/2023 1.91  0.30 - 4.20 uIU/mL Final          ASSESSMENT:   Kiesha RACHEL Pascual presents for cardiology evaluation with identified cardiac murmur, TTE revealing MVP with mild to severe MR. Patient is in great overall health. PMH limited to mild HLD diet controlled, osteopenia and seasonal allergies. No history of rheumatic fever.   Patient admits to feeling very good. She exercises at the [facility] three days a week without difficulty or limitation. No chest pain or pressure, no increased dyspnea at rest or with exertion, no palpitations, no edema or weight gain and no lightheadedness or syncope.     1. MVP (mitral valve prolapse)  2. Nonrheumatic mitral valve regurgitation    PLAN:   Patient with  identified MVP with primary moderate to severe MR due to prolapse. Patient has been largely asymptomatic. She will continue to remain active. LVEF 55-60%. Mild left atrial enlargement   Reviewed symptoms to monitor for and plan for repeat TTE in 6 months. Sooner if any new symptoms arise. No heart failure symptoms.   No palpitations or identified AF. No history of TIA or thrombotic event to warrant anticoagulation at this time.   BP well controlled with stable HR, may consider beta blocker.   She does not require use of diuretic. No indication for SBE prophylaxis.   If worsening MR and/ or symptoms, consider referral for delia-clip intervention.   Follow-up with cardiology in 6 months, repeat TTE prior.    Orders Placed This Encounter   Procedures    EKG 12-lead, tracing only (Same Day)    Echocardiogram Complete       Thank you for allowing me to participate in the care of your patient. Please do not hesitate to contact me if you have any questions.     Total time 52 min on date of encounter spent reviewing records, face to face time obtaining HPI, physical exam, reviewing results of recent testing and counseling on the above diagnoses and recommended plan of care.     Leslie Hart, APRN CNP [facility]

## 2024-03-05 NOTE — PROGRESS NOTES
PA for Oxycodone (Oxycontin) 10 mg 12 hr tablet Approved     Date(s) approved 1/1/2024 - 3/4/2025        Patient advised by [x] The [employer] Message                      [] Phone call       Pharmacy advised by [x]Fax                                     []Phone call    Approval letter scanned into Media Yes       Patient Information     Patient Name MRN Kiesha Kaye [id number] Female 1940      Progress Notes by Riri Messer DO at 10/4/2017  9:00 AM     Author:  Riri Messer DO Service:  (none) Author Type:  PHYS- Osteopathic     Filed:  10/4/2017  4:05 PM Encounter Date:  10/4/2017 Status:  Signed     :  Riri Messer DO (PHYS- Osteopathic)            Chief Complaint     Patient presents with       Medication Management      annual review       HPI: Ms. Garner is a 77 y.o. female who presents today for annual review of medications.    She overall has been feeling good.  She has been working on weight loss.  She is exercising regularly.  She has lost approximately 10 pounds in the last year.  This has been intentional.  She is using her fit bit to track her exercise.    She does have a history of hyperlipidemia.  She is curious what her cholesterol is with improvement in weight.  She is not interested in a statin however would consider a baby aspirin if her risk is high.    She does have a history of osteopenia.  She does take calcium and vitamin D.  She is on a multivitamin.  Personal history of fracture.  Her DEXA scan was done in 2015.  She does do weight training exercises.    She have a acute concern today of some hair loss.  This has been getting worse over time.  She does take a vitamin.  She had her thyroid checked however this was approximately 2 years ago.  She denies any other symptoms of hyper or hypothyroidism.    She is post menopausal.  She is up-to-date on her mammogram.  She is up-to-date on her colonoscopy.  We did discuss the flu vaccine in depth today and she is willing to get this.    History is discussed and updated on 10/4/2017 with patient.  It is current to the best of my knowledge as below.    Past Medical History:     Diagnosis  Date     Cataracts, bilateral      Duodenal ulcer due to Helicobacter pylori 1999    Treated.      H/O estrogen deficiency       Hyperlipidemia 10/24/2012    diet controlled      Intermittent palpitations     only with increased alcohol      Osteopenia 4/10/2012     Seasonal allergies         Past Surgical History:      Procedure  Laterality Date     CATARACT REMOVAL       COLONOSCOPY SCREENING  2016    normal; repeat 10 years if indicated       HYSTERECTOMY      Dr. Donnelly; for fibroids           Current Outpatient Prescriptions     Medication  Sig     Biotin 5 mg capsule Take 1 capsule by mouth once daily.     CALCIUM CARBONATE/VITAMIN D3 (CALCIUM 500 + D ORAL) Take  by mouth once daily. Take 1-2 daily     multivitamin (MVI) tablet Take 1 tablet by mouth once daily.     Psyllium Seed-Sucrose (METAMUCIL) powder Take 1 tsp by mouth once daily.     No current facility-administered medications for this visit.      Medications have been reviewed by me and are current to the best of my knowledge and ability.       No Known Allergies     Family History       Problem   Relation Age of Onset     Other  Father      Pneumonia and alcohol related illness       Memory loss  Mother      Other  Sister      Takatsubo cardiomyopathy       Dementia  Sister      Cancer-colon  Paternal Aunt      No Known Problems  Son      Genetic  Other      No Fhx of DM, CAD       Cancer-breast  No Family History        Family Status     Relation  Status     Father  at age 66     Mother  at age In her 80's     Sister Alive     Paternal Aunt  at age 87     Son Alive     Other      No Family History         Social History     Social History        Marital status:       Spouse name: Neptali     Number of children:  1     Years of education:  N/A     Occupational History       retired      Social History Main Topics         Smoking status:   Passive Smoke Exposure - Never Smoker     Smokeless tobacco:   Never Used     Alcohol use   0.0 oz/week     0 Standard drinks or equivalent per week        Comment: socially; likes red wine      Drug use:    "No     Sexual activity:   Not Currently     Other Topics   Concern      Service  No     Blood Transfusions  No     Caffeine Concern  No     Occupational Exposure  No     Hobby Hazards  No     Sleep Concern  No     Stress Concern  Yes     occasionally with 's health      Weight Concern  No     Special Diet  No     Back Care  No     Exercise  Yes     walking regularly      Bike Helmet  No     does not ride       Seat Belt  Yes     Self-Exams  Yes     Social History Narrative      -  Orestes Dunn son.     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     Then ran the [employer] on [address] for about 6 years.     Then worked at [employer].     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, taught for many years with           ROS  GEN: -fevers/-chills/-night sweats/+wt change - almost 10 pound weight loss in the last year, intentional   NEURO: -headaches/-vision changes  EARS: -hearing changes/-tinnitus  NOSE: -drainage/-congestion  MOUTH/THROAT: - sore throat/-dysphagia/-sores  LUNGS: -sob/-cough  CARDIOVASCULAR: -cp/-palpitations  GI: -pain/-diarrhea/-constipation/-bloody stools  : -dysuria/-hematuria/-sores/-vaginal discharge or bleeding  ENDOCRINE: -skin or hair changes  HEMATOLOGIC/LYMPHATIC: -swollen nodes/-easy bruising  SKIN: -rashes/-lesions/-breast or nipple changes/+ she does have a small spot on her left anterior leg that has concerned her however it has been present for years and is not growing   MSK/RHEUM: + Occasional joint pain/-swelling/-falls  NEURO: -weakness/-parasthesias  PSYCH: -anhedonia/-depression/-anxiety       EXAM:   /64  Pulse 68  Ht 1.581 m (5' 2.25\")  Wt 54.3 kg (119 lb 12.8 oz)  BMI 21.74 kg/m2  Estimated body mass index is 21.74 kg/(m^2) as calculated from the following:    Height as of this encounter: 1.581 m (5' 2.25\").    Weight as of this encounter: 54.3 kg (119 lb 12.8 oz).      GEN: Vitals reviewed.  Healthy appearing. Patient " is in no acute distress. Cooperative with exam.  HEENT: Normocephalic atraumatic.  Normal external eye, conjunctiva, lids, cornea with no scleral icterus or conjunctival erythema. Pupils equally round. Oropharynx with no erythema or exudates. Dentition adequate.    NECK: Supple; no thyromegaly or masses noted.  No cervical or supraclavicular lymphadenopathy.  CV: Heart regular in rate and rhythm with no murmur.  Radial and posterior tibial pulses palpable.  LUNGS: Lungs clear to auscultation bilaterally.  Chest rise equal bilaterally.  No accessory muscle use.  ABD:  Soft, nondistended.  SKIN: Warm and dry to touch.  No rash on face, arms and legs.  Small lump on the left anterior thigh approximately 1-2 mm consistent with scar tissue versus very small lipoma.  EXT: No clubbing or cyanosis.  No peripheral edema.  NEURO: Alert and oriented to person, place, and time.  Cranial nerves II-XII grossly intact with no focal or lateralizing deficits.  Muscle tone normal.  Gait normal. No tremor. Sensation intact to light touch.  MSK: ROM of upper and lower ext symmetric and full.  PSYCH: Affect appropriate. Speech fluent. Answers questions appropriately and thought process normal.    LABS: 10/4/2017 - Personally ordered/reviewed  Results for orders placed or performed in visit on 10/04/17      LIPID PANEL      Result  Value Ref Range    CHOLESTEROL,TOTAL 187 <200 mg/dL    TRIGLYCERIDES 90 <150 mg/dL    HDL CHOLESTEROL 60 23 - 92 mg/dL    NON-HDL CHOLESTEROL 127 <145 mg/dl    CHOL/HDL RATIO            3.12 <4.50                    LDL CHOLESTEROL 109 (H) <100 mg/dL    PROVIDER ORDERED STATUS FASTING    TSH      Result  Value Ref Range    TSH 2.51 0.34 - 5.60 uIU/mL           ASSESSMENT AND PLAN:    1. Osteopenia, unspecified location  - repeat Dexa 2290-2193  - recommend Calcium 1200-1500mg daily between diet and supplement; handout provided on calculating this  - recommend Vitamin D 1000 IU daily  - recommend strengthening  exercises  - work up for secondary causes includes normal TSH, blood sugar, malnutrition, chronic liver disease, premature menopause  - check vit D and Ca levels    2. Hyperlipidemia, unspecified hyperlipidemia type  - lipids have improved today with her weight loss.  She was congratulated on this.  At this point I do not think additional medications are needed although her overall risk for coronary disease is elevated due to her age.  If she is interested in this she will let me know at any point.  - LIPID PANEL; Future    3. Hair loss  - thyroid was checked today and normal.  We did discuss that this is likely due to aging.  She is to call if she has any changing symptoms or worsening problems at any point.  - TSH; Future    4. Need for influenza vaccination  - FLU VACCINE => 65 YRS HIGH DOSE TRIVALENT IIV3 IM        Return in about 1 year (around 10/4/2018) for Recheck/Follow Up.     Patient Instructions   Recommend taking Vitamin D 1000 IU daily.    Also, Calcium 1200-1500mg daily from supplement and diet combined.  Be sure to take into account the amount of calcium you may be getting from food daily so as not to get too much calcium.    Recommend daily exercise with a focus on low-weight strengthening.  Yoga is a great way to do this.    Please let me know if you have any questions regarding this.       Index [employer] Related topics   Calcium in the Diet   ________________________________________________________________________  KEY POINTS    Calcium is a mineral that is important for your heart, bones, teeth, and muscles to stay healthy.    Eating or drinking certain things can cause you to lose calcium.    You can get calcium from dairy products, calcium-fortified foods, or from supplements. If you can get enough calcium in your diet, you do not need to take calcium supplements.  ________________________________________________________________________  What is calcium?   Calcium is a mineral that is very  important for:    Heart health    Bone health    Teeth    Nerves    Muscles    Blood clotting    How much calcium do I need?   Many food products list the amount of calcium per serving on the label. Food labels list calcium as a % of the Daily Value (DV) based on 1,000 mg of calcium per day. Look for foods that provide 10% or more of the daily value for calcium.   The total amount of calcium you need, preferably from dairy foods, depends on your age and gender:      Group           Calcium/Day  --------------------------------------  Adults 19 to 50  1000 mg  Women 51 to 70   1200 mg  Men 51 to 70     1000 mg  Adults over 70   1200 mg  --------------------------------------  * mg = milligrams    What keeps me from getting enough calcium?   Here are some things that can make it harder for your body to get enough calcium:    Not getting enough vitamin D. Vitamin D increases the amount of calcium absorbed by your body. It s important to get enough sunlight to help your body make vitamin D and to choose foods that contain vitamin D. Milk contains vitamin D and some brands of cheese, yogurt, juice, and margarine have added vitamin D. Check labels for the amount of vitamin D per serving. Fish such as salmon, mackerel, and tuna are good natural sources of vitamin D. If your provider recommends that you take a calcium supplement, there are some that include vitamin D.    Too much fiber in the diet. This is more of a concern if you have low amounts of calcium in your diet. Take calcium supplements or eat calcium-fortified foods 2 hours before or after eating 100% bran products. Soaking beans in water and discarding the liquid before cooking can also help.    Soft drinks, energy drinks, tea, and coffee. People who drink these products instead of milk often don't get enough calcium.    Taking some medicines. Medicines such as some antibiotics, heartburn medicines that decrease stomach acid production, and antacids that contain  aluminum can make it harder for your body to absorb calcium.  These things can cause you to lose calcium:    Eating a lot of protein foods, such as meats, poultry, and eggs. The more protein you eat, the more calcium you lose. As long as your diet is balanced and contains enough calcium, this should not be a problem.    Eating a lot of salt. The more salt in your diet, the more calcium you lose. Limit the salt in your diet. Cutting back on salt and getting enough calcium can help lower blood pressure and help prevent fluid retention.  Milk products are one of the best sources of calcium. Calcium is also in many other foods such as vegetables, beans, nuts, seeds, and soy. However, the calcium in these foods is not absorbed as well as the calcium in milk products. Calcium has been added to some foods (fortified), which makes it easier to meet daily calcium needs, but it still can be hard for your body to get enough calcium if dairy foods are not a part of your diet.     Do I need a calcium supplement?   If you can get enough calcium in your diet, you do not need to take calcium supplements. People who get too much calcium have a higher risk for kidney stones and stroke. Men who take calcium supplements are at higher risk for a heart attack. Ask your healthcare provider if you should take calcium supplements, and which kind you should take.   Calcium supplements of 1000 mg or less per day do not prevent fractures in postmenopausal women. However, there may be some benefit from higher doses of calcium supplements. If you are a postmenopausal woman and you have never had a fracture, ask your healthcare provider if you should take calcium supplements.  You may need a supplement if you:    Have digestive problems that prevent you from absorbing calcium.    Avoid dairy products due to allergic or other reactions (such as lactose intolerance or milk allergy).    Don't eat any animal products.    Do not get enough calcium in  your diet.    Are pregnant or breast-feeding.    Have osteoporosis or osteopenia (weakened bones).    Have a vitamin D deficiency.  There are many kinds of calcium supplements. The most common are calcium carbonate and calcium citrate.     Calcium carbonate is best absorbed with a meal.    Calcium citrate can be taken on a full or empty stomach. Calcium citrate may be a better choice for older adults or younger people who have low levels of stomach acid.    Calcium phosphate, lactate, and gluconate are also well absorbed. However, the amount of calcium per pill is lower, so you may need to take many pills a day to meet your needs.  Your body absorbs calcium best if you take no more than 500 mg at a time, and take it two or more times per day as recommended by your healthcare provider. Look for calcium supplements that have the USP or Consumer Lab symbol on the label. Products with these labels have been tested to make sure they are absorbed by the body.    How can I eat the right amount of calcium?  Eat more calcium-rich foods. Here are some ideas for adding calcium to your diet.    Have low-fat or nonfat milk, cottage cheese with fruit, or yogurt for snacks.    Eat calcium-fortified breakfast cereals with rice, almond, or soy milk, or have calcium-fortified waffles or pancakes.    Cook hot cereals with milk instead of water.    Serve yogurt or milk smoothies instead of juice.    Add yogurt to lunches or use a dip made with yogurt when having a fruit snack.    Add lean shredded cheese to baked potatoes, vegetables, soups, and salads.    Use milk when making cream soups instead of water.    Serve flavored milk or hot chocolate for an evening treat.    Use Parmesan cheese topping for Italian dishes. A 2 tablespoon serving adds about 140 mg of calcium.    Serve a healthy vegetable pizza made with low-fat cheese.    Serve lean mozzarella string cheese with crackers and fruit for a snack.    Make puddings with  milk.    Get plenty of exercise. Walk a mile a day if you can and do strength training exercise a few times a week. Exercise helps your body to use calcium to strengthen your bones.  Some people cannot digest milk products because their bodies lack the enzyme needed to break down milk sugar. This problem is called lactose intolerance. If you have this problem, you can buy products such as Lactaid or Dairy Ease. These products contain lactase, which can help you digest milk products.    For more information contact:    The Academy of Nutrition and Dietetics  560.530.1620  http://www.eatright.org  Developed by [employer].  Adult Advisor 2016.3 published by [employer].  Last modified: 2015-04-17  Last reviewed: 2015-03-25  This content is reviewed periodically and is subject to change as new health information becomes available. The information is intended to inform and educate and is not a replacement for medical evaluation, advice, diagnosis or treatment by a healthcare professional.  References   Adult Advisor 2016.3 Index    Copyright   2016 [employer], a division of [employer] All rights reserved.               MALIK CISNEROS DO   10/4/2017 9:52 AM    This document was prepared using voice generated softwear. While every attempt was made for accuracy, grammatical errors may exist.

## 2024-07-16 ENCOUNTER — HOSPITAL ENCOUNTER (OUTPATIENT)
Dept: CARDIOLOGY | Facility: OTHER | Age: 84
Discharge: HOME | End: 2024-07-16
Attending: NURSE PRACTITIONER | Admitting: NURSE PRACTITIONER
Payer: MEDICARE

## 2024-07-16 VITALS — SYSTOLIC BLOOD PRESSURE: 147 MMHG | DIASTOLIC BLOOD PRESSURE: 88 MMHG

## 2024-07-16 DIAGNOSIS — I34.1 MVP (MITRAL VALVE PROLAPSE): ICD-10-CM

## 2024-07-16 DIAGNOSIS — I34.0 NONRHEUMATIC MITRAL VALVE REGURGITATION: ICD-10-CM

## 2024-07-16 LAB — LVEF ECHO: NORMAL

## 2024-07-16 PROCEDURE — 93306 TTE W/DOPPLER COMPLETE: CPT | Mod: 26 | Performed by: INTERNAL MEDICINE

## 2024-07-16 PROCEDURE — 93306 TTE W/DOPPLER COMPLETE: CPT

## 2024-07-23 ENCOUNTER — OFFICE VISIT (OUTPATIENT)
Dept: CARDIOLOGY | Facility: OTHER | Age: 84
End: 2024-07-23
Attending: NURSE PRACTITIONER
Payer: MEDICARE

## 2024-07-23 VITALS
TEMPERATURE: 97.3 F | OXYGEN SATURATION: 98 % | BODY MASS INDEX: 22.26 KG/M2 | SYSTOLIC BLOOD PRESSURE: 116 MMHG | DIASTOLIC BLOOD PRESSURE: 70 MMHG | WEIGHT: 121 LBS | HEART RATE: 72 BPM | HEIGHT: 62 IN | RESPIRATION RATE: 16 BRPM

## 2024-07-23 DIAGNOSIS — I34.0 NONRHEUMATIC MITRAL VALVE REGURGITATION: ICD-10-CM

## 2024-07-23 DIAGNOSIS — I34.1 MVP (MITRAL VALVE PROLAPSE): Primary | ICD-10-CM

## 2024-07-23 PROCEDURE — 99214 OFFICE O/P EST MOD 30 MIN: CPT | Performed by: NURSE PRACTITIONER

## 2024-07-23 PROCEDURE — G0463 HOSPITAL OUTPT CLINIC VISIT: HCPCS

## 2024-07-23 ASSESSMENT — PAIN SCALES - GENERAL: PAINLEVEL: NO PAIN (0)

## 2024-07-23 NOTE — PROGRESS NOTES
[facility] HEART CARE   CARDIOLOGY PROGRESS NOTE    Kiesha Garner   [address] [facility] [id number]    Riri Messer     Chief Complaint   Patient presents with    Echocardiogram Results     7/16/2024     Follow Up     6 month Cardiac Visit         HPI:   MRS. Garner is an 84 year old female who presents for cardiology follow-up to visit on 12/21/2023. She was initially evaluated by cardiology due to identified cardiac murmur, TTE revealing MVP with moderate to severe MR- newly identified. Patient is in great overall health. PMH limited to mild HLD diet controlled, osteopenia and seasonal allergies. No history of rheumatic fever known to patient and she denies any family history of CV disease.    At her last visit on 12/21/2023, Mrs Garner reported feeling very good. She exercises at the [facility] three days a week without difficulty or limitation. No chest pain or pressure, no increased dyspnea at rest or with exertion, no palpitations, no edema or weight gain and no lightheadedness or syncope.     Today, patient denies any change in symptoms. Rare brief racing heart palpitations, denies irregular heart beat. No increased dyspnea and rest or with exertion and no edema.     RELEVANT TESTING REVIEWED:  Echocardiogram 7/16/2024  Interpretation Summary  Global and regional left ventricular function is normal with an EF of 55-  60%.Left ventricular size is normal.  Global right ventricular function is normal.  Severe left atrial enlargement is present.  Severe primary, eccentric (anteriorly directed ) mitral insufficiency is  present from posterior leaflet prolapse.  IVC is normal.  This study was compared with the study from 12/12/2023 .  MR appear severe in the previous study as well. Recommend JENSEN to assess mitral  valve.    Echocardiogram 12/12/2023  Interpretation Summary  Global and regional left ventricular function is normal with an EF of 55-60%.  Mild concentric wall thickening consistent with left  ventricular hypertrophy  is present.  Global right ventricular function is normal.  Moderate to severe mitral insufficiency is present.  The inferior vena cava was normal in size with preserved respiratory  variability.  No pericardial effusion is present.      PAST MEDICAL HISTORY:   Past Medical History:   Diagnosis Date    Cataract     Duodenal ulcer without hemorrhage or perforation 1999    due to H pylori    Elevated antinuclear antibody (MARÍA) level 7/7/2018    Hyperlipidemia 10/24/2012    diet controlled    Osteopenia 04/10/2012    Other seasonal allergic rhinitis     Palpitations     only with increased alcohol          FAMILY HISTORY:   Family History   Problem Relation Age of Onset    Other - See Comments Mother         Memory loss    Other - See Comments Father         Pneumonia and alcohol related illness    Other - See Comments Sister         Takatsubo cardiomyopathy    Dementia Sister     No Known Problems Son     Colon Cancer Paternal Aunt     Genetic Disorder Other         Genetic,No Fhx of DM, CAD    Breast Cancer No family hx of         Cancer-breast          PAST SURGICAL HISTORY:   Past Surgical History:   Procedure Laterality Date    COLONOSCOPY  09/30/2016    normal; repeat 10 years if indicated    EXTRACAPSULAR CATARACT EXTRATION WITH INTRAOCULAR LENS IMPLANT      HYSTERECTOMY TOTAL ABDOMINAL  1984    Dr. Donnelly; for fibroids          SOCIAL HISTORY:   Social History     Socioeconomic History    Marital status:      Spouse name: Neptali    Number of children: None    Years of education: None    Highest education level: None   Tobacco Use    Smoking status: Never     Passive exposure: Past ( smoked)    Smokeless tobacco: Never   Vaping Use    Vaping Use: Never used   Substance and Sexual Activity    Alcohol use: Yes     Alcohol/week: 0.0 standard drinks of alcohol     Comment: socially; likes red wine    Drug use: Never     Comment:      Sexual activity: Not Currently     Partners: Male    Social History Narrative    ,  Neptali. 1 son.   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   Then ran the [employer] on [address] for about 6 years.   Then worked at [employer].   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, taught for many years with [employer]     Social Determinants of Health     Financial Resource Strain: Low Risk  (12/4/2023)    Financial Resource Strain     Within the past 12 months, have you or your family members you live with been unable to get utilities (heat, electricity) when it was really needed?: No   Food Insecurity: Low Risk  (12/4/2023)    Food Insecurity     Within the past 12 months, did you worry that your food would run out before you got money to buy more?: No     Within the past 12 months, did the food you bought just not last and you didn t have money to get more?: No   Transportation Needs: Low Risk  (12/4/2023)    Transportation Needs     Within the past 12 months, has lack of transportation kept you from medical appointments, getting your medicines, non-medical meetings or appointments, work, or from getting things that you need?: No   Interpersonal Safety: Low Risk  (12/21/2023)    Interpersonal Safety     Do you feel physically and emotionally safe where you currently live?: Yes     Within the past 12 months, have you been hit, slapped, kicked or otherwise physically hurt by someone?: No     Within the past 12 months, have you been humiliated or emotionally abused in other ways by your partner or ex-partner?: No   Housing Stability: Low Risk  (12/4/2023)    Housing Stability     Do you have housing? : Yes     Are you worried about losing your housing?: No          CURRENT MEDICATIONS:   Current Outpatient Medications   Medication Sig Dispense Refill    biotin 5 MG CAPS Take 5 mg by mouth daily      calcium carbonate-vitamin D (OSCAL W/D) 500-200 MG-UNIT tablet Take 1-2 tablets by mouth daily      Misc Natural Products (GLUCOSAMINE CHOND MSM  "FORMULA PO) Take 1 tablet by mouth daily      Multiple Vitamin (MULTI-VITAMINS) TABS Take 1 tablet by mouth daily       No current facility-administered medications for this visit.        ALLERGIES:   No Known Allergies       ROS:   CONSTITUTIONAL: No reported fever or chills. No changes in weight.  ENT: No visual disturbance, ear ache, epistaxis or sore throat.   CARDIOVASCULAR: No chest pain, chest pressure or chest discomfort. No palpitations or lower extremity edema.   RESPIRATORY: No shortness of breath, dyspnea upon exertion, cough, wheezing or hemoptysis. No orthopnea or PND.   GI: No reported abdominal pain.   NEUROLOGICAL: No lightheadedness, dizziness, syncope, ataxia, paresthesias or weakness.   HEMATOLOGIC: No history of anemia. No bleeding or excessive bruising. No history of blood clots.   MUSCULOSKELETAL: No new joint pain or swelling, no muscle pain.  ENDOCRINOLOGIC: No temperature intolerance. No hair or skin changes.  SKIN: No abnormal rashes or sores, no unusual itching.  PSYCHIATRIC: No history of depression or anxiety. No changes in mood, feeling down or anxious. No changes in sleep.      PHYSICAL EXAM:   /70 (BP Location: Right arm, Patient Position: Sitting, Cuff Size: Adult Regular)   Pulse 72   Temp 97.3  F (36.3  C) (Temporal)   Resp 16   Ht 1.575 m (5' 2\")   Wt 54.9 kg (121 lb)   SpO2 98%   BMI 22.13 kg/m    GENERAL: The patient is a well-developed, well-nourished, in no apparent distress.  HEENT: Head is normocephalic and atraumatic. Eyes are symmetrical with normal visual tracking. No icterus, no xanthelasmas. Nares appeared normal without nasal drainage. Mucous membranes are moist, no cyanosis.  NECK: Supple, no cervical bruits, JVP not visible.   CHEST/ LUNGS: Lungs clear to auscultation, no rales, rhonchi or wheezes, no use of accessory muscles, no retractions, respirations unlabored and normal respiratory rate.   CARDIO: Regular rate and rhythm normal with S1 and S2, " positive for grade III-IV holosystolic murmur, no click or rub.   ABD: Abdomen is nondistended.   EXTREMITIES: No edema present.   MUSCULOSKELETAL: No visible joint swelling.   NEUROLOGIC: Alert and oriented X3. Normal speech, gait and affect. No focal neurologic deficits.   SKIN: No jaundice. No rashes or visible skin lesions present. No ecchymosis.     LAB RESULTS:   Hospital Outpatient Visit on 07/16/2024   Component Date Value Ref Range Status    LVEF  07/16/2024 55-60%   Final         ASSESSMENT:   Kiesha Garner presents for cardiology follow-up to visit on 12/21/2023. She was initially evaluated by cardiology due to identified cardiac murmur, TTE revealing MVP with moderate to severe MR- newly identified. Patient is in great overall health. PMH limited to mild HLD diet controlled, osteopenia and seasonal allergies. No history of rheumatic fever known to patient and she denies any family history of CV disease.  Today, patient denies any change in symptoms. Rare brief racing heart palpitations, denies irregular heart beat. No increased dyspnea and rest or with exertion and no edema.     1. MVP (mitral valve prolapse)  2. Nonrheumatic mitral valve regurgitation    PLAN:   Patient with identified MVP- posterior leaflet with resulting severe primary MR. Patient has been largely asymptomatic.   Rare brief racing heart palpitations without identified cardiac arrhythmia.  Reviewed further evaluation of mitral valve by JENSEN assessment and I would like her to see structural heart team in consult when Dr. Robles is at [facility] on 7/29. Due to close timing of visit we will not yet have JENSEN completed prior.     Orders Placed This Encounter   Procedures    Transesophageal Echocardiogram       Thank you for allowing me to participate in the care of your patient. Please do not hesitate to contact me if you have any questions.     Leslie Hart, APRN CNP [facility]

## 2024-07-29 ENCOUNTER — OFFICE VISIT (OUTPATIENT)
Dept: CARDIOLOGY | Facility: OTHER | Age: 84
End: 2024-07-29
Attending: INTERNAL MEDICINE
Payer: MEDICARE

## 2024-07-29 VITALS
HEART RATE: 84 BPM | HEIGHT: 62 IN | BODY MASS INDEX: 22.19 KG/M2 | SYSTOLIC BLOOD PRESSURE: 142 MMHG | RESPIRATION RATE: 12 BRPM | OXYGEN SATURATION: 98 % | TEMPERATURE: 98 F | DIASTOLIC BLOOD PRESSURE: 81 MMHG | WEIGHT: 120.6 LBS

## 2024-07-29 DIAGNOSIS — I34.1 MVP (MITRAL VALVE PROLAPSE): Primary | ICD-10-CM

## 2024-07-29 PROCEDURE — 99215 OFFICE O/P EST HI 40 MIN: CPT | Performed by: INTERNAL MEDICINE

## 2024-07-29 PROCEDURE — 93005 ELECTROCARDIOGRAM TRACING: CPT | Performed by: INTERNAL MEDICINE

## 2024-07-29 PROCEDURE — G0463 HOSPITAL OUTPT CLINIC VISIT: HCPCS

## 2024-07-29 PROCEDURE — 93010 ELECTROCARDIOGRAM REPORT: CPT | Performed by: INTERNAL MEDICINE

## 2024-07-29 ASSESSMENT — PAIN SCALES - GENERAL: PAINLEVEL: NO PAIN (0)

## 2024-07-29 NOTE — NURSING NOTE
"Chief Complaint   Patient presents with    Consult     Consult for Mitral valve prolapse.       Initial BP (!) 142/81 (BP Location: Right arm, Patient Position: Sitting, Cuff Size: Adult Regular)   Pulse 84   Temp 98  F (36.7  C) (Temporal)   Resp 12   Ht 1.575 m (5' 2\")   Wt 54.7 kg (120 lb 9.6 oz)   SpO2 98%   BMI 22.06 kg/m   Estimated body mass index is 22.06 kg/m  as calculated from the following:    Height as of this encounter: 1.575 m (5' 2\").    Weight as of this encounter: 54.7 kg (120 lb 9.6 oz).  Meds Reconciled: complete  Pt is not on Aspirin  Pt is not on a Statin  PHQ and/or CHAD reviewed. Pt referred to PCP/MH Provider as appropriate.    Ameya Holcomb     "

## 2024-07-29 NOTE — NURSING NOTE
[place] Cardiomyopathy Questionnaire  (CQ-12)  Date: 7/29/24    Pre MitraClip      1.  A.  5      B.  5       C.  5  2.  5  3.  7  4.  7  5.  5  6.  5  7.  5  8.  A. 5     B.  5     C.  5           Provided additional education regarding TAVR/MitraClip procedure, after being present for discussion with physician. Explained the work-up process and next steps for patient. Patient provided our direct contact number and instructed to call with any questions.

## 2024-07-29 NOTE — PROGRESS NOTES
[facility] HEART [facility]  CARDIOVASCULAR DIVISION    VALVE CLINIC CONSULTATION    PRIMARY CARDIOLOGIST: Leslie Hart      PERTINENT CLINICAL HISTORY & REASON FOR CONSULTATION:     Kiesha Garner is a very pleasant 84 year old female with severe mitral regurgitation referred to our clinic for evaluation and consideration for potential transcatheter mitral valve repair. Her mitral regurgitation is due to posterior leaflet prolapse with anteriorly directed jet. LVEF is 55-60% without other significant valve disease. She has no other significant cardiac history.     She is doing well today and has no acute complaints. She exercises several times a week and says that she can walk for several miles without problems. She denies any dyspnea or other change in her functional status. She also denies any chest pain, shortness of breath, orthopnea, PND, leg swelling, weight gain, palpitations, and/or syncope.       PAST MEDICAL HISTORY:     Past Medical History:   Diagnosis Date    Cataract     Duodenal ulcer without hemorrhage or perforation 1999    due to H pylori    Elevated antinuclear antibody (MARÍA) level 7/7/2018    Hyperlipidemia 10/24/2012    diet controlled    Osteopenia 04/10/2012    Other seasonal allergic rhinitis     Palpitations     only with increased alcohol      PAST SURGICAL HISTORY:     Past Surgical History:   Procedure Laterality Date    COLONOSCOPY  09/30/2016    normal; repeat 10 years if indicated    EXTRACAPSULAR CATARACT EXTRATION WITH INTRAOCULAR LENS IMPLANT      HYSTERECTOMY TOTAL ABDOMINAL  1984    Dr. Donnelly; for fibroids      CURRENT MEDICATIONS:     Current Outpatient Medications   Medication Sig Dispense Refill    biotin 5 MG CAPS Take 5 mg by mouth daily      calcium carbonate-vitamin D (OSCAL W/D) 500-200 MG-UNIT tablet Take 1-2 tablets by mouth daily      Misc Natural Products (GLUCOSAMINE CHOND MSM FORMULA PO) Take 1 tablet by mouth daily      Multiple Vitamin (MULTI-VITAMINS) TABS Take  1 tablet by mouth daily        ALLERGIES:     No Known Allergies     FAMILY HISTORY:     Family History   Problem Relation Age of Onset    Other - See Comments Mother         Memory loss    Other - See Comments Father         Pneumonia and alcohol related illness    Other - See Comments Sister         Takatsubo cardiomyopathy    Dementia Sister     No Known Problems Son     Colon Cancer Paternal Aunt     Genetic Disorder Other         Genetic,No Fhx of DM, CAD    Breast Cancer No family hx of         Cancer-breast      SOCIAL HISTORY:     Social History     Socioeconomic History    Marital status:      Spouse name: Neptali    Number of children: None    Years of education: None    Highest education level: None   Tobacco Use    Smoking status: Never     Passive exposure: Past ( smoked)    Smokeless tobacco: Never   Vaping Use    Vaping status: Never Used   Substance and Sexual Activity    Alcohol use: Yes     Alcohol/week: 0.0 standard drinks of alcohol     Comment: socially; likes red wine    Drug use: Never     Comment:      Sexual activity: Not Currently     Partners: Male   Social History Narrative    ,  Neptali. 1 son.   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   Then worked at [employer].   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, taught for many years with [employer]     Social Determinants of Health     Financial Resource Strain: Low Risk  (12/4/2023)    Financial Resource Strain     Within the past 12 months, have you or your family members you live with been unable to get utilities (heat, electricity) when it was really needed?: No   Food Insecurity: Low Risk  (12/4/2023)    Food Insecurity     Within the past 12 months, did you worry that your food would run out before you got money to buy more?: No     Within the past 12 months, did the food you bought just not last and you didn t have money to get more?: No  "  Transportation Needs: Low Risk  (12/4/2023)    Transportation Needs     Within the past 12 months, has lack of transportation kept you from medical appointments, getting your medicines, non-medical meetings or appointments, work, or from getting things that you need?: No   Interpersonal Safety: Low Risk  (7/23/2024)    Interpersonal Safety     Do you feel physically and emotionally safe where you currently live?: Yes     Within the past 12 months, have you been hit, slapped, kicked or otherwise physically hurt by someone?: No     Within the past 12 months, have you been humiliated or emotionally abused in other ways by your partner or ex-partner?: No   Housing Stability: Low Risk  (12/4/2023)    Housing Stability     Do you have housing? : Yes     Are you worried about losing your housing?: No      REVIEW OF SYSTEMS:     Constitutional: No fevers or chills  Skin: No new rash or itching  Eyes: No acute change in vision  Ears/Nose/Throat: No purulent rhinorrhea, new hearing loss, or new vertigo  Respiratory: No cough or hemoptysis  Cardiovascular: See HPI  Gastrointestinal: No change in appetite, vomiting, hematemesis or diarrhea  Genitourinary: No dysuria or hematuria  Musculoskeletal: No new back pain, neck pain or muscle pain  Neurologic: No new headaches, focal weakness or behavior changes  Psychiatric: No hallucinations, excessive alcohol consumption or illegal drug usage  Hematologic/Lymphatic/Immunologic: No bleeding, chills, fever, night sweats or weight loss  Endocrine: No new cold intolerance, heat intolerance, polyphagia, polydipsia or polyuria      PHYSICAL EXAMINATION:     BP (!) 142/81 (BP Location: Right arm, Patient Position: Sitting, Cuff Size: Adult Regular)   Pulse 84   Temp 98  F (36.7  C) (Temporal)   Resp 12   Ht 1.575 m (5' 2\")   Wt 54.7 kg (120 lb 9.6 oz)   SpO2 98%   BMI 22.06 kg/m      GENERAL: No acute distress.  HEENT: EOMI. Sclerae white, not injected. Nares clear. Pharynx without " erythema or exudate.   Neck: No adenopathy. No thyromegaly. No jugular venous distension.   Heart: Regular rate and rhythm. Systolic murmur best appreciated at the apex.   Lungs: Clear to auscultation. No ronchi, wheezes, rales.   Abdomen: Soft, nontender, nondistended. Bowel sounds present.  Extremities: No clubbing, cyanosis, or edema.   Neurologic: Alert and oriented to person/place/time, normal speech and affect. No focal deficits.  Skin: No petechiae, purpura or rash.     LABORATORY DATA:     LIPID RESULTS:  Lab Results   Component Value Date    CHOL 199 12/04/2023    CHOL 193 07/08/2019    HDL 77 12/04/2023    HDL 60 07/08/2019     (H) 12/04/2023     (H) 07/08/2019    TRIG 80 12/04/2023    TRIG 90 07/08/2019     LIVER ENZYME RESULTS:  Lab Results   Component Value Date    AST 27 12/04/2023    AST 21 06/28/2018    ALT 24 12/04/2023    ALT 18 06/28/2018     CBC RESULTS:  Lab Results   Component Value Date    WBC 5.0 12/04/2023    WBC 6.9 06/28/2018    RBC 4.54 12/04/2023    RBC 4.93 06/28/2018    HGB 13.0 12/04/2023    HGB 13.8 06/28/2018    HCT 39.5 12/04/2023    HCT 42.2 06/28/2018    MCV 87 12/04/2023    MCV 86 06/28/2018    MCH 28.6 12/04/2023    MCH 28.0 06/28/2018    MCHC 32.9 12/04/2023    MCHC 32.7 06/28/2018    RDW 13.8 12/04/2023    RDW 13.5 06/28/2018     12/04/2023     06/28/2018     BMP RESULTS:  Lab Results   Component Value Date     (L) 12/04/2023     07/06/2018    POTASSIUM 3.9 12/04/2023    POTASSIUM 4.0 11/02/2022    POTASSIUM 4.8 06/28/2018    CHLORIDE 97 (L) 12/04/2023    CHLORIDE 100 11/02/2022    CHLORIDE 95 (L) 06/28/2018    CO2 27 12/04/2023    CO2 29 11/02/2022    CO2 29 06/28/2018    ANIONGAP 9 12/04/2023    ANIONGAP 8 11/02/2022    ANIONGAP 6 06/28/2018    GLC 95 12/04/2023    GLC 89 11/02/2022    GLC 98 06/28/2018    BUN 9.8 12/04/2023    BUN 18 11/02/2022    BUN 9 06/28/2018    CR 0.76 12/04/2023    CR 0.69 06/28/2018    GFRESTIMATED 77  "12/04/2023    GFRESTIMATED 82 06/28/2018    GFRESTBLACK >90 06/28/2018    INDIGO 9.6 12/04/2023    INDIGO 9.8 06/28/2018      A1C RESULTS:  No results found for: \"A1C\"    INR RESULTS:  No results found for: \"INR\"     PROCEDURES & FURTHER ASSESSMENTS:     ECG dated 12/21/23:  Normal sinus rhythm with isolated PVC    Echocardiogram dated 7/16/24:  Global and regional left ventricular function is normal with an EF of 55-  60%.Left ventricular size is normal.  Global right ventricular function is normal.  Severe left atrial enlargement is present.  Severe primary, eccentric (anteriorly directed ) mitral insufficiency is  present from posterior leaflet prolapse.  IVC is normal.     This study was compared with the study from 12/12/2023 .  MR appear severe in the previous study as well. Recommend JENSEN to assess mitral  valve.    NYHA Class: I-II     CLINICAL IMPRESSION:     Kiesha Garner is a very pleasant 84 year old female with severe mitral regurgitation referred to our clinic for evaluation and consideration for potential transcatheter mitral valve repair. Her mitral regurgitation is due to posterior leaflet prolapse with anteriorly directed jet. LVEF is 55-60% without other significant valve disease. She has no other significant cardiac history.    Reviewed diagnosis of severe mitral regurgitation, natural history, indications for surgery including symptoms, reduced EF, AF, pHTN, or LVESD > 40mm. While her most recent echocardiogram notes normal LV size, her EF is now <60% and thus she meets criteria for repair. Discussed surgical and percutaneous options options.      Patient is an appropriate candidate for MitraClip mitral valve repair based on age, frailty, co-morbidities and surgical mortality risk estimation of 2.7% based on the STS score.    Once work-up is complete the case will be discussed at our multi-disciplinary conference for consensus decision and procedural planning.     Plan Summary:  1) Severe " symptomatic mitral regurgitation  - Will obtain JENSEN to better assess leaflet morphology and for procedural planning   - Presentation of data to the Structural Heart team to assess the optimal treatment of her mitral regurgitation     Patient was evaluated in clinic with Dr. Robles.    Jose Lopez MD  [facility] Cardiology Team  Patient seen and examined with Cardiovascular fellow and agree with the assessment and plan described above.     Jason Robles M.D.  Interventional Cardiology  [facility]  Patient Care Team:  Riri Messer DO as PCP - General (Internal Medicine)  Riri Messer DO as Assigned PCP  Leslie Hart APRN CNP as Assigned Heart and Vascular Provider  Ella Sage, RN as Registered Nurse (Cardiology)   not examined

## 2024-07-29 NOTE — PATIENT INSTRUCTIONS
You were seen today in the Cardiovascular Clinic at the [facility].      Cardiology Providers you saw during your visit:  Dr. Robles    Recommendations:    JENSEN - needs to be at the [place]. Jane will contact you to schedule    Follow-up:    Once all your testing is complete, I will present your case at valve conference. (Valve conference is held every Thursday. I will contact you either Thursday or Friday with the results)    Nursing questions:  Ella PERRY;  "[employer] messages are great! Otherwise [phone] if you don't hear back within 24 hrs please call back.   For emergencies call 911.      Thank you for your visit today!     Ella Sage RN  Lead Structural Heart Coordinator  TAVR, MitraClip and Watchman        Pre-procedure instructions - Echocardiogram JENSEN  Patient Education    Your arrival time is TBD.  Location is [address] Waiting Room  [facility]:   arrive 15 minutes prior to examination time.    How do I prepare for my exam?  No food or drink: 8 hours before the test  If you take antacids or water pills (diuretics): Do not take them until after your test. You may take blood pressure medicine with a few sips of water.  If you have diabetes:  -   Morning slots preferred  -   If you take insulin, call your diabetes care team. Ask if you should take a   dose the morning of your test.  -   If you take diabetes medicine by mouth, don't take it on the morning of your test. Bring it with you to take after the test. (If you have questions, call your diabetes care team.)  Bring a list of any medicines you are taking.     What Should I wear? Please bring or wear a comfortable two-piece outfit.     How long does the Exam Take? Most tests take up to 90 minutes.        Do I need a ? YES  Plan to have a responsible adult take you home after the test. After the exam you may not drive, take a bus or taxi by  yourself.  -   Someone should stay with you for 6 hours after your test.     What Should I do after the Exam? Do not drive for 24 hours after the test.     What is this test Transesophageal echocardiography (JENSEN) is a test that produces pictures of your heart. JENSEN uses high-frequency sound waves (ultrasound) to make detailed pictures of your heart and the arteries that lead to and from it. Unlike a standard echocardiogram, the echo transducer that produces the sound waves for JENSEN is attached to a thin tube that passes through your mouth, down your throat and into your esophagus. Because the esophagus is so close to the upper chambers of the heart, very clear images of those heart structures and valves can be obtained.

## 2024-08-05 LAB
ATRIAL RATE - MUSE: 80 BPM
DIASTOLIC BLOOD PRESSURE - MUSE: NORMAL MMHG
INTERPRETATION ECG - MUSE: NORMAL
P AXIS - MUSE: 45 DEGREES
PR INTERVAL - MUSE: 162 MS
QRS DURATION - MUSE: 68 MS
QT - MUSE: 374 MS
QTC - MUSE: 431 MS
R AXIS - MUSE: -19 DEGREES
SYSTOLIC BLOOD PRESSURE - MUSE: NORMAL MMHG
T AXIS - MUSE: 43 DEGREES
VENTRICULAR RATE- MUSE: 80 BPM

## 2024-08-06 ENCOUNTER — HOSPITAL ENCOUNTER (OUTPATIENT)
Dept: CARDIOLOGY | Facility: CLINIC | Age: 84
Discharge: HOME | End: 2024-08-06
Attending: NURSE PRACTITIONER | Admitting: NURSE PRACTITIONER
Payer: MEDICARE

## 2024-08-06 VITALS
HEART RATE: 74 BPM | DIASTOLIC BLOOD PRESSURE: 78 MMHG | SYSTOLIC BLOOD PRESSURE: 152 MMHG | OXYGEN SATURATION: 100 % | RESPIRATION RATE: 16 BRPM

## 2024-08-06 DIAGNOSIS — I34.1 MVP (MITRAL VALVE PROLAPSE): ICD-10-CM

## 2024-08-06 DIAGNOSIS — I34.0 NONRHEUMATIC MITRAL VALVE REGURGITATION: ICD-10-CM

## 2024-08-06 LAB — LVEF ECHO: NORMAL

## 2024-08-06 PROCEDURE — 99152 MOD SED SAME PHYS/QHP 5/>YRS: CPT | Performed by: STUDENT IN AN ORGANIZED HEALTH CARE EDUCATION/TRAINING PROGRAM

## 2024-08-06 PROCEDURE — 93325 DOPPLER ECHO COLOR FLOW MAPG: CPT | Mod: 26 | Performed by: STUDENT IN AN ORGANIZED HEALTH CARE EDUCATION/TRAINING PROGRAM

## 2024-08-06 PROCEDURE — 250N000009 HC RX 250: Performed by: STUDENT IN AN ORGANIZED HEALTH CARE EDUCATION/TRAINING PROGRAM

## 2024-08-06 PROCEDURE — 93325 DOPPLER ECHO COLOR FLOW MAPG: CPT

## 2024-08-06 PROCEDURE — 93320 DOPPLER ECHO COMPLETE: CPT | Mod: 26 | Performed by: STUDENT IN AN ORGANIZED HEALTH CARE EDUCATION/TRAINING PROGRAM

## 2024-08-06 PROCEDURE — 250N000011 HC RX IP 250 OP 636: Performed by: STUDENT IN AN ORGANIZED HEALTH CARE EDUCATION/TRAINING PROGRAM

## 2024-08-06 PROCEDURE — 76376 3D RENDER W/INTRP POSTPROCES: CPT

## 2024-08-06 PROCEDURE — 93312 ECHO TRANSESOPHAGEAL: CPT | Mod: 26 | Performed by: STUDENT IN AN ORGANIZED HEALTH CARE EDUCATION/TRAINING PROGRAM

## 2024-08-06 RX ORDER — SODIUM CHLORIDE 9 MG/ML
1000 INJECTION, SOLUTION INTRAVENOUS CONTINUOUS
Status: DISCONTINUED | OUTPATIENT
Start: 2024-08-06 | End: 2024-08-07 | Stop reason: HOSPADM

## 2024-08-06 RX ORDER — FLUMAZENIL 0.1 MG/ML
0.2 INJECTION, SOLUTION INTRAVENOUS
Status: DISCONTINUED | OUTPATIENT
Start: 2024-08-06 | End: 2024-08-07 | Stop reason: HOSPADM

## 2024-08-06 RX ORDER — NALOXONE HYDROCHLORIDE 0.4 MG/ML
0.4 INJECTION, SOLUTION INTRAMUSCULAR; INTRAVENOUS; SUBCUTANEOUS
Status: DISCONTINUED | OUTPATIENT
Start: 2024-08-06 | End: 2024-08-07 | Stop reason: HOSPADM

## 2024-08-06 RX ORDER — NALOXONE HYDROCHLORIDE 0.4 MG/ML
0.2 INJECTION, SOLUTION INTRAMUSCULAR; INTRAVENOUS; SUBCUTANEOUS
Status: DISCONTINUED | OUTPATIENT
Start: 2024-08-06 | End: 2024-08-07 | Stop reason: HOSPADM

## 2024-08-06 RX ORDER — FENTANYL CITRATE 50 UG/ML
25 INJECTION, SOLUTION INTRAMUSCULAR; INTRAVENOUS
Status: DISCONTINUED | OUTPATIENT
Start: 2024-08-06 | End: 2024-08-07 | Stop reason: HOSPADM

## 2024-08-06 RX ORDER — ACYCLOVIR 200 MG/1
9.5 CAPSULE ORAL
Status: DISCONTINUED | OUTPATIENT
Start: 2024-08-06 | End: 2024-08-07 | Stop reason: HOSPADM

## 2024-08-06 RX ORDER — LIDOCAINE HYDROCHLORIDE 20 MG/ML
15 SOLUTION OROPHARYNGEAL ONCE
Status: COMPLETED | OUTPATIENT
Start: 2024-08-06 | End: 2024-08-06

## 2024-08-06 RX ADMIN — MIDAZOLAM 1 MG: 1 INJECTION INTRAMUSCULAR; INTRAVENOUS at 13:31

## 2024-08-06 RX ADMIN — FENTANYL CITRATE 25 MCG: 50 INJECTION, SOLUTION INTRAMUSCULAR; INTRAVENOUS at 14:08

## 2024-08-06 RX ADMIN — MIDAZOLAM 0.5 MG: 1 INJECTION INTRAMUSCULAR; INTRAVENOUS at 14:08

## 2024-08-06 RX ADMIN — FENTANYL CITRATE 25 MCG: 50 INJECTION, SOLUTION INTRAMUSCULAR; INTRAVENOUS at 13:27

## 2024-08-06 RX ADMIN — FENTANYL CITRATE 25 MCG: 50 INJECTION, SOLUTION INTRAMUSCULAR; INTRAVENOUS at 13:34

## 2024-08-06 RX ADMIN — MIDAZOLAM 1 MG: 1 INJECTION INTRAMUSCULAR; INTRAVENOUS at 13:27

## 2024-08-06 RX ADMIN — LIDOCAINE HYDROCHLORIDE 30 ML: 20 SOLUTION ORAL at 13:21

## 2024-08-06 RX ADMIN — TOPICAL ANESTHETIC 0.5 ML: 200 SPRAY DENTAL; PERIODONTAL at 13:21

## 2024-08-06 RX ADMIN — MIDAZOLAM 0.5 MG: 1 INJECTION INTRAMUSCULAR; INTRAVENOUS at 13:59

## 2024-08-06 RX ADMIN — MIDAZOLAM 1 MG: 1 INJECTION INTRAMUSCULAR; INTRAVENOUS at 13:34

## 2024-08-06 NOTE — PROGRESS NOTES
Pt arrived in ECHO department for scheduled JENSEN.   Procedure explained, questions answered and consent signed. Discharge instructions discussed with patient/granddaughter and given written copy.   Pt's throat sprayed at 1320, therefore pt will not be able to eat or drink until 2 hours after at 1520. Informed pt of this time and encouraged to start with warm fluids and soft foods.    Pt tolerated procedure well, and was given a total of 75 mcg IV fentanyl and 4 mg IV versed for conscious sedation.    JENSEN probe 67 used for procedure. Blood tinged sputum noted to with suctioning during procedure. MD aware.   Pt denied chest pain, but did report sore throat after procedure. Was told to monitor and reach out to MD or echo staff if pain does not subside or pain increases or with new difficulty swallowing. VSS. Escorted out to front lobby by staff in w/c to meet pt's ride home.

## 2024-08-06 NOTE — PRE-PROCEDURE
GENERAL PRE-PROCEDURE:   Procedure:  Transesophageal echocardiogram  Date/Time:  8/6/2024 12:33 PM    Written consent obtained?: Yes    Risks and benefits: Risks, benefits and alternatives were discussed    Consent given by:  Patient  Patient states understanding of procedure being performed: Yes    Patient's understanding of procedure matches consent: Yes    Procedure consent matches procedure scheduled: Yes    Expected level of sedation:  Moderate  Appropriately NPO:  Yes  ASA Class:  4  Mallampati  :  Grade 4- soft palate obscured by base of tongue  Lungs:  Lungs clear with good breath sounds bilaterally  Heart:  Normal heart sounds and rate  History & Physical reviewed:  History and physical reviewed and no updates needed  Statement of review:  I have reviewed the lab findings, diagnostic data, medications, and the plan for sedation

## 2024-08-08 ENCOUNTER — TELEPHONE (OUTPATIENT)
Dept: CARDIOLOGY | Facility: CLINIC | Age: 84
End: 2024-08-08
Payer: MEDICARE

## 2024-08-08 NOTE — TELEPHONE ENCOUNTER
Telephone call to Kiesha to review JENSEN results, mitral regurgitation is moderate. No indications presently for MitraClip.    Plan to have her follow up with [facility] in 1 year with TTE prior.            Ella Sage RN, BSN  Lead Structural Heart Coordinator  TAVR, MitraClip and Watchman

## 2024-08-12 ENCOUNTER — NURSE TRIAGE (OUTPATIENT)
Dept: INTERNAL MEDICINE | Facility: OTHER | Age: 84
End: 2024-08-12
Payer: MEDICARE

## 2024-08-12 DIAGNOSIS — I34.0 NONRHEUMATIC MITRAL VALVE REGURGITATION: Primary | ICD-10-CM

## 2024-08-12 NOTE — TELEPHONE ENCOUNTER
Patient's granddaughter is calling regarding patient. Patient had a JENSEN done at the  last week. Patient is acting out of sorts, very forgetful, and granddaughter is wondering if this is common.   Granddaughter would like a phone call at [phone]. Granddaughter aware [facility] is out this week.  Enriqueta De La Cruz on 8/12/2024 at 10:12 AM

## 2024-08-12 NOTE — TELEPHONE ENCOUNTER
Per review of chart appears patient is scheduled to see Dr. Dow on 8/13/2024, not myself.    Mami Montgomery PA-C  8/12/2024  2:13 PM

## 2024-08-12 NOTE — TELEPHONE ENCOUNTER
"Patient scheduled for today:     8/12/2024 2:40 PM Mami Montgomery PA-C       Reason for Disposition   [1] Worsening confusion AND [2] gradual onset (days to weeks)    Additional Information   Negative: [1] Difficult to awaken or acting confused (e.g., disoriented, slurred speech) AND [2] present now AND [3] new-onset AND [4] has diabetes (diabetes mellitus)   Negative: [1] Difficult to awaken or acting confused (e.g., disoriented, slurred speech) AND [2] present now AND [3] new-onset   Negative: [1] Weakness of the face, arm, or leg on one side of the body AND [2] new-onset   Negative: [1] Numbness of the face, arm, or leg on one side of the body AND [2] new-onset   Negative: [1] Loss of speech or garbled speech AND [2] new-onset   Negative: Shock suspected (e.g., cold/pale/clammy skin, too weak to stand, low BP, rapid pulse)   Negative: Sounds like a life-threatening emergency to the triager   Negative: [1] New-onset confusion AND [2] no prior diagnosis of dementia   Negative: Swallowing problems   Negative: Weakness or fatigue is main concern   Negative: Anxiety or panic attack is main concern   Negative: Depression is main concern   Negative: Severe agitation or behavior problem (e.g., patient endangering self or others)   Negative: [1] Worsening confusion AND [2] new-onset (hours to 3 days)   Negative: [1] Seeing, hearing, or feeling things that are not there (i.e., visual, auditory, or tactile hallucinations) AND [2] new or worsening   Negative: Fever > 100.4 F (38.0 C)   Negative: Patient sounds very sick or weak to the triager   Negative: [1] Caller has URGENT question (includes prescribed medication questions) AND [2] triager unable to answer question    Answer Assessment - Initial Assessment Questions  1. MAIN CONCERN OR SYMPTOM:  \"What is your main concern right now?\" \"What questions do you have?\" \"What's the main symptom you're worried about?\" (e.g., confusion, memory loss)      Minor memory loss or " "confusion     2. ONSET:  \"When did the symptom start (or worsen)?\" (minutes, hours, days, weeks)      Patient repeats stories, but granddaughter states her memory/confusion is a night and day difference since she had the ECHO JENSEN test at the  earlier this month.     3. BETTER-SAME-WORSE: \"Are you (the patient) getting better, staying the same, or getting worse compared to the day you (they) were diagnosed or most recent hospital discharge ?\"      N/a     4. DIAGNOSIS: \"Was the dementia diagnosed by a doctor?\" If Yes, ask: \"When?\" (e.g., days, months, years ago)      Has not been diagnosed     5. MEDICINES: \"Has there been any change in medicines recently?\" (e.g., narcotics, antihistamines, benzodiazepines, etc.)      Denies     6. OTHER SYMPTOMS: \"Are there any other symptoms?\" (e.g., fever, cough, pain, falling)      Denies     7. SUPPORT: Document living circumstances and support (e.g., family, nursing home)      Lives in her own home    Protocols used: Dementia Symptoms and [id number]    "

## 2024-08-13 ENCOUNTER — OFFICE VISIT (OUTPATIENT)
Dept: INTERNAL MEDICINE | Facility: OTHER | Age: 84
End: 2024-08-13
Payer: MEDICARE

## 2024-08-13 ENCOUNTER — HOSPITAL ENCOUNTER (OUTPATIENT)
Dept: GENERAL RADIOLOGY | Facility: OTHER | Age: 84
Discharge: HOME | End: 2024-08-13
Payer: MEDICARE

## 2024-08-13 VITALS
DIASTOLIC BLOOD PRESSURE: 80 MMHG | HEART RATE: 84 BPM | WEIGHT: 118.8 LBS | SYSTOLIC BLOOD PRESSURE: 140 MMHG | RESPIRATION RATE: 16 BRPM | OXYGEN SATURATION: 98 % | TEMPERATURE: 97.8 F | BODY MASS INDEX: 21.73 KG/M2

## 2024-08-13 DIAGNOSIS — R68.89 FORGETFULNESS: Primary | ICD-10-CM

## 2024-08-13 DIAGNOSIS — I34.0 NONRHEUMATIC MITRAL VALVE REGURGITATION: ICD-10-CM

## 2024-08-13 DIAGNOSIS — R41.0 CONFUSION: ICD-10-CM

## 2024-08-13 LAB
ALBUMIN UR-MCNC: NEGATIVE MG/DL
ANION GAP SERPL CALCULATED.3IONS-SCNC: 10 MMOL/L (ref 7–15)
APPEARANCE UR: CLEAR
BACTERIA #/AREA URNS HPF: ABNORMAL /HPF
BASOPHILS # BLD AUTO: 0 10E3/UL (ref 0–0.2)
BASOPHILS NFR BLD AUTO: 0 %
BILIRUB UR QL STRIP: NEGATIVE
BUN SERPL-MCNC: 17.5 MG/DL (ref 8–23)
CALCIUM SERPL-MCNC: 10 MG/DL (ref 8.8–10.4)
CHLORIDE SERPL-SCNC: 100 MMOL/L (ref 98–107)
COLOR UR AUTO: YELLOW
CREAT SERPL-MCNC: 0.79 MG/DL (ref 0.51–0.95)
EGFRCR SERPLBLD CKD-EPI 2021: 73 ML/MIN/1.73M2
EOSINOPHIL # BLD AUTO: 0.1 10E3/UL (ref 0–0.7)
EOSINOPHIL NFR BLD AUTO: 2 %
ERYTHROCYTE [DISTWIDTH] IN BLOOD BY AUTOMATED COUNT: 14 % (ref 10–15)
GLUCOSE SERPL-MCNC: 98 MG/DL (ref 70–99)
GLUCOSE UR STRIP-MCNC: NEGATIVE MG/DL
HCO3 SERPL-SCNC: 26 MMOL/L (ref 22–29)
HCT VFR BLD AUTO: 39.6 % (ref 35–47)
HGB BLD-MCNC: 12.8 G/DL (ref 11.7–15.7)
HGB UR QL STRIP: NEGATIVE
HYALINE CASTS: 1 /LPF
IMM GRANULOCYTES # BLD: 0 10E3/UL
IMM GRANULOCYTES NFR BLD: 0 %
KETONES UR STRIP-MCNC: NEGATIVE MG/DL
LEUKOCYTE ESTERASE UR QL STRIP: ABNORMAL
LYMPHOCYTES # BLD AUTO: 2 10E3/UL (ref 0.8–5.3)
LYMPHOCYTES NFR BLD AUTO: 27 %
MCH RBC QN AUTO: 28.6 PG (ref 26.5–33)
MCHC RBC AUTO-ENTMCNC: 32.3 G/DL (ref 31.5–36.5)
MCV RBC AUTO: 89 FL (ref 78–100)
MONOCYTES # BLD AUTO: 0.6 10E3/UL (ref 0–1.3)
MONOCYTES NFR BLD AUTO: 8 %
MUCOUS THREADS #/AREA URNS LPF: PRESENT /LPF
NEUTROPHILS # BLD AUTO: 4.7 10E3/UL (ref 1.6–8.3)
NEUTROPHILS NFR BLD AUTO: 63 %
NITRATE UR QL: NEGATIVE
NRBC # BLD AUTO: 0 10E3/UL
NRBC BLD AUTO-RTO: 0 /100
PH UR STRIP: 5.5 [PH] (ref 5–9)
PLATELET # BLD AUTO: 216 10E3/UL (ref 150–450)
POTASSIUM SERPL-SCNC: 4.3 MMOL/L (ref 3.4–5.3)
RBC # BLD AUTO: 4.47 10E6/UL (ref 3.8–5.2)
RBC URINE: 1 /HPF
SODIUM SERPL-SCNC: 136 MMOL/L (ref 135–145)
SP GR UR STRIP: 1.01 (ref 1–1.03)
UROBILINOGEN UR STRIP-MCNC: NORMAL MG/DL
WBC # BLD AUTO: 7.5 10E3/UL (ref 4–11)
WBC URINE: 1 /HPF

## 2024-08-13 PROCEDURE — G2211 COMPLEX E/M VISIT ADD ON: HCPCS

## 2024-08-13 PROCEDURE — 85025 COMPLETE CBC W/AUTO DIFF WBC: CPT | Mod: ZL

## 2024-08-13 PROCEDURE — G0463 HOSPITAL OUTPT CLINIC VISIT: HCPCS | Mod: 25

## 2024-08-13 PROCEDURE — 36415 COLL VENOUS BLD VENIPUNCTURE: CPT | Mod: ZL

## 2024-08-13 PROCEDURE — 80048 BASIC METABOLIC PNL TOTAL CA: CPT | Mod: ZL

## 2024-08-13 PROCEDURE — 81001 URINALYSIS AUTO W/SCOPE: CPT | Mod: ZL

## 2024-08-13 PROCEDURE — 71046 X-RAY EXAM CHEST 2 VIEWS: CPT

## 2024-08-13 PROCEDURE — 87086 URINE CULTURE/COLONY COUNT: CPT | Mod: ZL

## 2024-08-13 PROCEDURE — 99214 OFFICE O/P EST MOD 30 MIN: CPT

## 2024-08-13 ASSESSMENT — ENCOUNTER SYMPTOMS
CONSTIPATION: 0
HEARTBURN: 0
FEVER: 0
CHILLS: 0
DIARRHEA: 0
VOMITING: 0
NAUSEA: 0

## 2024-08-13 ASSESSMENT — PAIN SCALES - GENERAL: PAINLEVEL: NO PAIN (0)

## 2024-08-13 NOTE — NURSING NOTE
"Chief Complaint   Patient presents with    RECHECK     Confusion    Patient presents to the clinic today for confusion and memory loss.        Initial There were no vitals taken for this visit. Estimated body mass index is 22.06 kg/m  as calculated from the following:    Height as of 7/29/24: 1.575 m (5' 2\").    Weight as of 7/29/24: 54.7 kg (120 lb 9.6 oz).  Meds Reconciled: joe Deshpande LPN,LPN on 8/13/2024 at 2:03 PM  Ext. [phone]          "

## 2024-08-13 NOTE — PROGRESS NOTES
Assessment & Plan     ICD-10-CM    1. Forgetfulness  R68.89 UA with Microscopic     UA with Microscopic     Urine Culture     XR Chest 2 Views     CBC and Differential     Basic Metabolic Panel     CBC and Differential     Basic Metabolic Panel      2. Nonrheumatic mitral valve regurgitation  I34.0          No neurological concerns noted on physical assessment.  We proceeded with urinalysis, CBC, BMP, chest x-ray to rule out potential infection.  Lab work and UA are normal, urine culture pending.  Chest x-ray does not show secondary sign of pneumonia.  Patient states that her sore throat is improving.  Suspect that her recent forgetfulness could be related to effects of anesthesia.  Instructed patient and granddaughter to monitor symptoms closely, she develops any new or worsening symptoms to return back to be reevaluated.  Patient and daughter are in agreement with the treatment plan, will follow-up if symptoms were to change.    Mitral valve regurgitation recent: JENSEN shows moderate MR secondary to prolapse of the P2 segment of the posterior mitral valve leaflet, patient was recommended to repeat echocardiogram in 1 year.  She is following with cardiology.    The longitudinal plan of care for the diagnosis(es)/condition(s) as documented were addressed during this visit. Due to the added complexity in care, I will continue to support Kiesha in the subsequent management and with ongoing continuity of care.              No follow-ups on file.      SEEMA Pelaez CNP  [facility]    Review of Systems   Constitutional:  Negative for chills and fever.   Gastrointestinal:  Negative for constipation, diarrhea, heartburn, nausea and vomiting.   All other systems reviewed and are negative.            Subjective   Kiesha is a 84 year old, presenting for the following health issues:  RECHECK (Confusion )    Patient presents to clinic with granddaughter for concerns of increased forgetfulness  since her recent transesophageal echocardiogram which she received sedation for.  She traveled several hours the day of the procedure and reports that she needed a moderate amount of anesthesia. She had a very sore throat after the procedure which has been gradually improving.  Granddaughter has noticed that she has been a bit more forgetful since the procedure and has been forgetting recent conversations.  Denies any other new neurological symptoms such as slurred speech, dizziness, headaches, weakness, aphasia.  Denies any fevers, chills, increased fatigue, shortness of breath or cough.        History of Present Illness       Reason for visit:  Possible infection    She eats 2-3 servings of fruits and vegetables daily.She consumes 1 sweetened beverage(s) daily.She exercises with enough effort to increase her heart rate 30 to 60 minutes per day.    She is taking medications regularly.                     Objective    BP (!) 140/80 (BP Location: Right arm, Patient Position: Sitting, Cuff Size: Adult Regular)   Pulse 84   Temp 97.8  F (36.6  C) (Temporal)   Resp 16   Wt 53.9 kg (118 lb 12.8 oz)   SpO2 98%   BMI 21.73 kg/m    Body mass index is 21.73 kg/m .  Physical Exam  Vitals reviewed.   Constitutional:       General: She is not in acute distress.     Appearance: She is well-developed.   HENT:      Head: Normocephalic and atraumatic.      Nose: Nose normal.      Mouth/Throat:      Pharynx: No oropharyngeal exudate.   Eyes:      General: No visual field deficit or scleral icterus.     Conjunctiva/sclera: Conjunctivae normal.      Pupils: Pupils are equal, round, and reactive to light.   Neck:      Thyroid: No thyromegaly.   Cardiovascular:      Rate and Rhythm: Normal rate and regular rhythm.      Heart sounds: No murmur heard.  Pulmonary:      Effort: Pulmonary effort is normal. No respiratory distress.      Breath sounds: Normal breath sounds. No wheezing.   Musculoskeletal:         General: No tenderness  or deformity. Normal range of motion.      Cervical back: Normal range of motion and neck supple.   Skin:     General: Skin is warm and dry.      Findings: No rash.   Neurological:      General: No focal deficit present.      Mental Status: She is alert and oriented to person, place, and time.      GCS: GCS eye subscore is 4. GCS verbal subscore is 5. GCS motor subscore is 6.      Cranial Nerves: No cranial nerve deficit, dysarthria or facial asymmetry.      Sensory: Sensation is intact.      Motor: No weakness or tremor.      Coordination: Coordination is intact. Coordination normal.      Gait: Gait is intact.   Psychiatric:         Attention and Perception: Attention and perception normal.         Mood and Affect: Mood and affect normal. Mood is not anxious. Affect is not inappropriate.         Speech: Speech normal.         Behavior: Behavior normal.         Thought Content: Thought content normal.         Cognition and Memory: Cognition is not impaired. Memory is not impaired. She does not exhibit impaired recent memory or impaired remote memory.      Comments: Answers questions appropriately, talkative        Results for orders placed or performed during the hospital encounter of 08/13/24   XR Chest 2 Views     Status: None    Narrative    Procedure:XR CHEST 2 VIEWS    Clinical history:Female, 84 years, Confusion    Technique: Single view was obtained.    Comparison: No relevant prior imaging.    Findings: The cardiac silhouette is within normal limits.. The  pulmonary vasculature is within normal limits.    The lungs are hyperinflated however clear Bony structures are  unremarkable.      Impression    Impression:   Hyperinflation of the lungs without evidence of pneumonia, CHF or  other acute abnormality.    LEONORA DAVIDSON MD         SYSTEM ID:  [id number]   Results for orders placed or performed in visit on 08/13/24   UA with Microscopic     Status: Abnormal   Result Value Ref Range    Color Urine Yellow  Colorless, Straw, Light Yellow, Yellow    Appearance Urine Clear Clear    Glucose Urine Negative Negative mg/dL    Bilirubin Urine Negative Negative    Ketones Urine Negative Negative mg/dL    Specific Gravity Urine 1.014 1.000 - 1.030    Blood Urine Negative Negative    pH Urine 5.5 5.0 - 9.0    Protein Albumin Urine Negative Negative mg/dL    Urobilinogen Urine Normal Normal, 2.0 mg/dL    Nitrite Urine Negative Negative    Leukocyte Esterase Urine Small (A) Negative    Bacteria Urine Few (A) None Seen /HPF    Mucus Urine Present (A) None Seen /LPF    RBC Urine 1 <=2 /HPF    WBC Urine 1 <=5 /HPF    Hyaline Casts Urine 1 <=2 /LPF   Basic Metabolic Panel     Status: Normal   Result Value Ref Range    Sodium 136 135 - 145 mmol/L    Potassium 4.3 3.4 - 5.3 mmol/L    Chloride 100 98 - 107 mmol/L    Carbon Dioxide (CO2) 26 22 - 29 mmol/L    Anion Gap 10 7 - 15 mmol/L    Urea Nitrogen 17.5 8.0 - 23.0 mg/dL    Creatinine 0.79 0.51 - 0.95 mg/dL    GFR Estimate 73 >60 mL/min/1.73m2    Calcium 10.0 8.8 - 10.4 mg/dL    Glucose 98 70 - 99 mg/dL   CBC with platelets and differential     Status: None   Result Value Ref Range    WBC Count 7.5 4.0 - 11.0 10e3/uL    RBC Count 4.47 3.80 - 5.20 10e6/uL    Hemoglobin 12.8 11.7 - 15.7 g/dL    Hematocrit 39.6 35.0 - 47.0 %    MCV 89 78 - 100 fL    MCH 28.6 26.5 - 33.0 pg    MCHC 32.3 31.5 - 36.5 g/dL    RDW 14.0 10.0 - 15.0 %    Platelet Count 216 150 - 450 10e3/uL    % Neutrophils 63 %    % Lymphocytes 27 %    % Monocytes 8 %    % Eosinophils 2 %    % Basophils 0 %    % Immature Granulocytes 0 %    NRBCs per 100 WBC 0 <1 /100    Absolute Neutrophils 4.7 1.6 - 8.3 10e3/uL    Absolute Lymphocytes 2.0 0.8 - 5.3 10e3/uL    Absolute Monocytes 0.6 0.0 - 1.3 10e3/uL    Absolute Eosinophils 0.1 0.0 - 0.7 10e3/uL    Absolute Basophils 0.0 0.0 - 0.2 10e3/uL    Absolute Immature Granulocytes 0.0 <=0.4 10e3/uL    Absolute NRBCs 0.0 10e3/uL   CBC and Differential     Status: None    Narrative    The  following orders were created for panel order CBC and Differential.  Procedure                               Abnormality         Status                     ---------                               -----------         ------                     CBC with platelets and d...[[id number]]                      Final result                 Please view results for these tests on the individual orders.                   Signed Electronically by: SEEMA Pelaez CNP

## 2024-08-13 NOTE — CONFIDENTIAL NOTE
After proper verification, patients Granddaughter Kiesha was informed that patients appointment was changed to 2:20 and moved to a 40 minute spot with another provider.  Di Deshpande LPN on 8/13/2024 at 10:56 AM  EXT. [id number]

## 2024-08-15 LAB — BACTERIA UR CULT: NO GROWTH

## 2024-08-16 ENCOUNTER — TELEPHONE (OUTPATIENT)
Dept: INTERNAL MEDICINE | Facility: OTHER | Age: 84
End: 2024-08-16
Payer: MEDICARE

## 2024-08-16 NOTE — TELEPHONE ENCOUNTER
"Granddaughter verified patients name and date of birth.  Message 'Please call patient and let her know her labwork and chest x-ray look normal \"  Given to granddaughter   No further questions   "

## 2024-08-16 NOTE — TELEPHONE ENCOUNTER
Granddaughter called to talk to NKK's nurse. She is wondering about any results from last appt. Please call.    Yael Silverio on 8/16/2024 at 11:22 AM

## 2024-12-13 SDOH — HEALTH STABILITY: PHYSICAL HEALTH: ON AVERAGE, HOW MANY MINUTES DO YOU ENGAGE IN EXERCISE AT THIS LEVEL?: 60 MIN

## 2024-12-13 SDOH — HEALTH STABILITY: PHYSICAL HEALTH: ON AVERAGE, HOW MANY DAYS PER WEEK DO YOU ENGAGE IN MODERATE TO STRENUOUS EXERCISE (LIKE A BRISK WALK)?: 4 DAYS

## 2024-12-13 ASSESSMENT — SOCIAL DETERMINANTS OF HEALTH (SDOH): HOW OFTEN DO YOU GET TOGETHER WITH FRIENDS OR RELATIVES?: THREE TIMES A WEEK

## 2024-12-18 ENCOUNTER — OFFICE VISIT (OUTPATIENT)
Dept: INTERNAL MEDICINE | Facility: OTHER | Age: 84
End: 2024-12-18
Attending: INTERNAL MEDICINE
Payer: MEDICARE

## 2024-12-18 VITALS
HEART RATE: 76 BPM | WEIGHT: 124.2 LBS | SYSTOLIC BLOOD PRESSURE: 138 MMHG | DIASTOLIC BLOOD PRESSURE: 96 MMHG | RESPIRATION RATE: 16 BRPM | OXYGEN SATURATION: 98 % | BODY MASS INDEX: 22.86 KG/M2 | HEIGHT: 62 IN

## 2024-12-18 DIAGNOSIS — M85.89 OSTEOPENIA OF MULTIPLE SITES: ICD-10-CM

## 2024-12-18 DIAGNOSIS — Z00.00 ENCOUNTER FOR MEDICARE ANNUAL WELLNESS EXAM: Primary | ICD-10-CM

## 2024-12-18 DIAGNOSIS — Z13.1 SCREENING FOR DIABETES MELLITUS: ICD-10-CM

## 2024-12-18 DIAGNOSIS — Z13.220 SCREENING FOR HYPERLIPIDEMIA: ICD-10-CM

## 2024-12-18 DIAGNOSIS — Z78.0 ASYMPTOMATIC MENOPAUSAL STATE: ICD-10-CM

## 2024-12-18 DIAGNOSIS — M19.041 OA (OSTEOARTHRITIS) OF FINGER, RIGHT: ICD-10-CM

## 2024-12-18 DIAGNOSIS — M54.2 NECK PAIN: ICD-10-CM

## 2024-12-18 DIAGNOSIS — R68.89 FORGETFULNESS: ICD-10-CM

## 2024-12-18 LAB
ALBUMIN SERPL BCG-MCNC: 4.5 G/DL (ref 3.5–5.2)
ALP SERPL-CCNC: 69 U/L (ref 40–150)
ALT SERPL W P-5'-P-CCNC: 26 U/L (ref 0–50)
ANION GAP SERPL CALCULATED.3IONS-SCNC: 6 MMOL/L (ref 7–15)
AST SERPL W P-5'-P-CCNC: 29 U/L (ref 0–45)
BILIRUB SERPL-MCNC: 0.6 MG/DL
BUN SERPL-MCNC: 18.4 MG/DL (ref 8–23)
CALCIUM SERPL-MCNC: 9.5 MG/DL (ref 8.8–10.4)
CHLORIDE SERPL-SCNC: 104 MMOL/L (ref 98–107)
CHOLEST SERPL-MCNC: 226 MG/DL
CREAT SERPL-MCNC: 0.79 MG/DL (ref 0.51–0.95)
EGFRCR SERPLBLD CKD-EPI 2021: 73 ML/MIN/1.73M2
ERYTHROCYTE [DISTWIDTH] IN BLOOD BY AUTOMATED COUNT: 13.7 % (ref 10–15)
FASTING STATUS PATIENT QL REPORTED: YES
FASTING STATUS PATIENT QL REPORTED: YES
GLUCOSE SERPL-MCNC: 94 MG/DL (ref 70–99)
HCO3 SERPL-SCNC: 30 MMOL/L (ref 22–29)
HCT VFR BLD AUTO: 40.7 % (ref 35–47)
HDLC SERPL-MCNC: 75 MG/DL
HGB BLD-MCNC: 13.2 G/DL (ref 11.7–15.7)
LDLC SERPL CALC-MCNC: 128 MG/DL
MCH RBC QN AUTO: 28.7 PG (ref 26.5–33)
MCHC RBC AUTO-ENTMCNC: 32.4 G/DL (ref 31.5–36.5)
MCV RBC AUTO: 89 FL (ref 78–100)
NONHDLC SERPL-MCNC: 151 MG/DL
PLATELET # BLD AUTO: 207 10E3/UL (ref 150–450)
POTASSIUM SERPL-SCNC: 4.3 MMOL/L (ref 3.4–5.3)
PROT SERPL-MCNC: 7 G/DL (ref 6.4–8.3)
RBC # BLD AUTO: 4.6 10E6/UL (ref 3.8–5.2)
SODIUM SERPL-SCNC: 140 MMOL/L (ref 135–145)
TRIGL SERPL-MCNC: 113 MG/DL
VIT B12 SERPL-MCNC: 577 PG/ML (ref 232–1245)
WBC # BLD AUTO: 6.9 10E3/UL (ref 4–11)

## 2024-12-18 PROCEDURE — 82607 VITAMIN B-12: CPT | Mod: ZL | Performed by: INTERNAL MEDICINE

## 2024-12-18 PROCEDURE — 82247 BILIRUBIN TOTAL: CPT | Mod: ZL | Performed by: INTERNAL MEDICINE

## 2024-12-18 PROCEDURE — G0008 ADMIN INFLUENZA VIRUS VAC: HCPCS

## 2024-12-18 PROCEDURE — 84155 ASSAY OF PROTEIN SERUM: CPT | Mod: ZL | Performed by: INTERNAL MEDICINE

## 2024-12-18 PROCEDURE — 85027 COMPLETE CBC AUTOMATED: CPT | Mod: ZL | Performed by: INTERNAL MEDICINE

## 2024-12-18 PROCEDURE — 80061 LIPID PANEL: CPT | Mod: ZL | Performed by: INTERNAL MEDICINE

## 2024-12-18 PROCEDURE — 84460 ALANINE AMINO (ALT) (SGPT): CPT | Mod: ZL | Performed by: INTERNAL MEDICINE

## 2024-12-18 PROCEDURE — G0463 HOSPITAL OUTPT CLINIC VISIT: HCPCS | Mod: 25

## 2024-12-18 PROCEDURE — 36415 COLL VENOUS BLD VENIPUNCTURE: CPT | Mod: ZL | Performed by: INTERNAL MEDICINE

## 2024-12-18 PROCEDURE — 90662 IIV NO PRSV INCREASED AG IM: CPT

## 2024-12-18 ASSESSMENT — PAIN SCALES - GENERAL: PAINLEVEL_OUTOF10: NO PAIN (0)

## 2024-12-18 NOTE — NURSING NOTE
"Chief Complaint   Patient presents with    Medicare Annual Exam       Initial BP (!) 138/96   Pulse 76   Resp 16   Ht 1.575 m (5' 2\")   Wt 56.3 kg (124 lb 3.2 oz)   SpO2 98%   Breastfeeding No   BMI 22.72 kg/m   Estimated body mass index is 22.72 kg/m  as calculated from the following:    Height as of this encounter: 1.575 m (5' 2\").    Weight as of this encounter: 56.3 kg (124 lb 3.2 oz).  Medication Review: complete    The next two questions are to help us understand your food security.  If you are feeling you need any assistance in this area, we have resources available to support you today.          12/13/2024   SDOH- Food Insecurity   Within the past 12 months, did you worry that your food would run out before you got money to buy more? N    Within the past 12 months, did the food you bought just not last and you didn t have money to get more? N        Patient-reported         Health Care Directive:  Patient has a Health Care Directive on file      Wendy Redding LPN      "

## 2024-12-18 NOTE — PATIENT INSTRUCTIONS
Ice/heat every 3-4 hours, gentle exercise/rest as much as possible.     Caution with NSAIDS (ibuprofen, aspirin, naproxen, aleve, advil) due to risk for increased blood pressure, heart disease, stomach pain/nausea/ulcers and kidney damage; use minimal amount necessary     Ok to take Acetaminophen 8 hour extended release (Tylenol Arthritis) 650mg - 1300mg three times a day as needed; Maximum of 4000mg daily     - Return/call as needed for follow-up should any new symptoms develop, for worsening of current symptoms or if symptoms do not resolve with above plan.

## 2024-12-18 NOTE — PROGRESS NOTES
Preventive Care Visit  [facility] AND [facility]  Riri Messer DO, Internal Medicine  Dec 18, 2024      Assessment & Plan     Encounter for Medicare annual wellness exam    Screening for hyperlipidemia  - Lipid Profile; Future    Screening for diabetes mellitus  - Comprehensive metabolic panel; Future    OA (osteoarthritis) of finger, right  Discussed options including topical medications, ice, heat and over-the-counter Tylenol.  She is to let us know if at any point she would like referral to orthopedics  - CBC with platelets; Future    Forgetfulness  She does repeat herself during her visit today.  Labs overall have been reassuring however will check B12 blood work today.  Suspect mild cognitive impairment.  - Vitamin B12; Future    Neck pain  Can use Tylenol as needed.  Offered physical therapy.  She declined today.  She is to let us know if she would like referral in the future.    Osteopenia of multiple sites  -Consider medication treatment if she has significant change from 3 years ago  - DX Bone Density; Future    Asymptomatic menopausal state  See above  - DX Bone Density; Future    Patient has been advised of split billing requirements and indicates understanding: Yes        Counseling  Appropriate preventive services were addressed with this patient via screening, questionnaire, or discussion as appropriate for fall prevention, nutrition, physical activity, Tobacco-use cessation, social engagement, weight loss and cognition.  Checklist reviewing preventive services available has been given to the patient.  Reviewed patient's diet, addressing concerns and/or questions.   The patient was instructed to see the dentist every 6 months.     Return in about 1 year (around 12/18/2025) for Annual Review with renewal of all medications, and as needed sooner.    Sary Pop is a 84 year old, presenting for the following:  Medicare Annual Exam        12/18/2024     8:34 AM   Additional Questions    Roomed by KERRI Ramos           History of Present Illness       Hyperlipidemia:  She presents for follow up of hyperlipidemia.   She is not taking medication to lower cholesterol. She is not having myalgia or other side effects to statin medications.     Patient reports that overall she has been feeling well.  She is due for recheck of her cholesterol and some other labs.  She continues off of any prescription medications.  She is on supplements.    She has had some pain in her fingers.  She does have known osteoarthritis.  She feels this is worse on the right than the left.  It is primarily in the first MCP joint.    She has been having some forgetfulness.  She did pass her memory test today for the most part.    She has had some neck pain off and on.  She had a JENSEN earlier this year and does feel that it flared up her pain.    She has a history of osteopenia.  She is due for repeat DEXA scan.        Health Care Directive  Patient has a Health Care Directive on file  Advance care planning document is on file and is current.      12/13/2024   General Health   How would you rate your overall physical health? Good   Feel stress (tense, anxious, or unable to sleep) Not at all            12/13/2024   Nutrition   Diet: Low fat/cholesterol            12/13/2024   Exercise   Days per week of moderate/strenous exercise 4 days   Average minutes spent exercising at this level 60 min            12/13/2024   Social Factors   Frequency of gathering with friends or relatives Three times a week   Worry food won't last until get money to buy more No   Food not last or not have enough money for food? No   Do you have housing? (Housing is defined as stable permanent housing and does not include staying ouside in a car, in a tent, in an abandoned building, in an overnight shelter, or couch-surfing.) Yes   Are you worried about losing your housing? No   Lack of transportation? No   Unable to get utilities (heat,electricity)? No             12/13/2024   Fall Risk   Fallen 2 or more times in the past year? No    Trouble with walking or balance? No        Patient-reported          12/13/2024   Activities of Daily Living- Home Safety   Needs help with the following daily activites None of the above   Safety concerns in the home None of the above            12/13/2024   Dental   Dentist two times every year? (!) NO            12/13/2024   Hearing Screening   Hearing concerns? None of the above            12/13/2024   Driving Risk Screening   Patient/family members have concerns about driving No            12/13/2024   General Alertness/Fatigue Screening   Have you been more tired than usual lately? No            12/13/2024   Urinary Incontinence Screening   Bothered by leaking urine in past 6 months No            12/13/2024   TB Screening   Were you born outside of the [place]? No            Today's PHQ-2 Score:       12/18/2024     8:11 AM   PHQ-2 ( 1999 [employer])   Q1: Little interest or pleasure in doing things 0    Q2: Feeling down, depressed or hopeless 0    PHQ-2 Score 0    Q1: Little interest or pleasure in doing things Not at all   Q2: Feeling down, depressed or hopeless Not at all   PHQ-2 Score 0       Patient-reported           12/13/2024   Substance Use   Alcohol more than 3/day or more than 7/wk No   Do you have a current opioid prescription? No   How severe/bad is pain from 1 to 10? 3/10   Do you use any other substances recreationally? No        Social History     Tobacco Use    Smoking status: Never     Passive exposure: Past ( smoked)    Smokeless tobacco: Never   Vaping Use    Vaping status: Never Used   Substance Use Topics    Alcohol use: Yes     Alcohol/week: 0.0 standard drinks of alcohol     Comment: socially; likes red wine    Drug use: Never     Comment:            Mammogram Screening - After age 74- determine frequency with patient based on health status, life expectancy and patient goals          Reviewed and updated as  needed this visit by Provider   Tobacco  Allergies  Meds  Problems  Med Hx  Surg Hx  Fam Hx  Soc   Hx Sexual Activity          Current Outpatient Medications   Medication Sig Dispense Refill    biotin 5 MG CAPS Take 5 mg by mouth daily      calcium carbonate-vitamin D (OSCAL W/D) 500-200 MG-UNIT tablet Take 1-2 tablets by mouth daily      Misc Natural Products (GLUCOSAMINE CHOND MSM FORMULA PO) Take 1 tablet by mouth daily      Multiple Vitamin (MULTI-VITAMINS) TABS Take 1 tablet by mouth daily       Current providers sharing in care for this patient include:  Patient Care Team:  Riri Messer DO as PCP - General (Internal Medicine)  Riri Messer DO as Assigned PCP  Leslie Hart APRN CNP as Assigned Heart and Vascular Provider  Ella Sage, RN as Registered Nurse (Cardiology)    The following health maintenance items are reviewed in Epic and correct as of today:  Health Maintenance   Topic Date Due    RSV VACCINE (1 - 1-dose 75+ series) Never done    INFLUENZA VACCINE (1) 09/01/2024    LIPID  12/04/2024    MEDICARE ANNUAL WELLNESS VISIT  12/18/2025    FALL RISK ASSESSMENT  12/18/2025    ADVANCE CARE PLANNING  12/04/2028    DTAP/TDAP/TD IMMUNIZATION (2 - Td or Tdap) 12/06/2029    DEXA  12/06/2036    PHQ-2 (once per calendar year)  Completed    Pneumococcal Vaccine: 65+ Years  Completed    ZOSTER IMMUNIZATION  Completed    HPV IMMUNIZATION  Aged Out    MENINGITIS IMMUNIZATION  Aged Out    RSV MONOCLONAL ANTIBODY  Aged Out    COVID-19 Vaccine  Discontinued       ROS:  CONSTITUTIONAL: Negative for fever, chills, night sweats, significant change in weight  INTEGUMENTARY/SKIN/LYMPH: Negative for worrisome rashes, moles or lesions; swollen lymph nodes  EYES: Negative for significant vision changes or irritation  ENT: Negative for additional ear, mouth and throat problems  RESP: Negative for significant cough or SOB  CV: Negative for chest pain, palpitations or increased peripheral edema  GI:  "Negative for abdominal pain, or change in bowel habits or blood in the stools/black stools  : Negative for unusual urinary or vaginal symptoms. No vaginal bleeding.  MUSCULOSKELETAL: Joint pain in the hands R >L  NEURO: Negative for new headaches, weakness or paraesthesias  PSYCHIATRIC: Negative for changes in mood or affect; significant anxiety or depression       Objective    Exam  BP (!) 138/96   Pulse 76   Resp 16   Ht 1.575 m (5' 2\")   Wt 56.3 kg (124 lb 3.2 oz)   SpO2 98%   Breastfeeding No   BMI 22.72 kg/m     Estimated body mass index is 22.72 kg/m  as calculated from the following:    Height as of this encounter: 1.575 m (5' 2\").    Weight as of this encounter: 56.3 kg (124 lb 3.2 oz).    Physical Exam  GEN: Vitals reviewed. Healthy appearing. Patient is in no acute distress. Cooperative with exam.  HEENT: Normocephalic atraumatic.  Eyes grossly normal to inspection.  No discharge or erythema, or obvious scleral/conjunctival abnormalities. Oropharynx with no erythema or exudates. Dentition adequate.  EACs clear bilaterally, TM gray with normal landmarks.  NECK: Supple; no masses noted.  No cervical or supraclavicular lymphadenopathy. Thyroid nodule noted.  CV: Heart regular in rate and rhythm with quiet murmur.    LUNGS: No audible wheeze, cough, or visible cyanosis.  No visible retractions or increased work of breathing.  Lungs clear to auscultation bilaterally.    ABD:  Nondistended  SKIN: Warm and dry to touch.  Visible skin clear. No significant rash, abnormal pigmentation or lesions.  EXT: No clubbing or cyanosis.  No peripheral edema.  NEURO: Alert and oriented to person, place, and time.  Cranial nerves II-XII grossly intact with no focal or lateralizing deficits.  Muscle tone normal.  Gait normal. No tremor.   MSK: ROM of upper and lower ext symmetric and full.  PSYCH: Mood is good.  Mentation appears normal, affect normal/bright, judgement and insight intact, normal speech and appearance " well-groomed.          12/18/2024   Mini Cog   Clock Draw Score 2 Normal   3 Item Recall 3 objects recalled   Mini Cog Total Score 5                 Signed Electronically by: Riri Messer, DO

## 2025-02-04 ENCOUNTER — HOSPITAL ENCOUNTER (OUTPATIENT)
Dept: BONE DENSITY | Facility: OTHER | Age: 85
Discharge: HOME | End: 2025-02-04
Attending: INTERNAL MEDICINE
Payer: MEDICARE

## 2025-02-04 DIAGNOSIS — M85.89 OSTEOPENIA OF MULTIPLE SITES: ICD-10-CM

## 2025-02-04 DIAGNOSIS — Z78.0 ASYMPTOMATIC MENOPAUSAL STATE: ICD-10-CM

## 2025-02-04 PROCEDURE — 77080 DXA BONE DENSITY AXIAL: CPT

## 2025-02-04 PROCEDURE — 77081 DXA BONE DENSITY APPENDICULR: CPT

## 2025-02-17 ENCOUNTER — TELEPHONE (OUTPATIENT)
Dept: CARDIOLOGY | Facility: CLINIC | Age: 85
End: 2025-02-17
Payer: MEDICARE

## 2025-02-17 NOTE — TELEPHONE ENCOUNTER
Spoke to pt and pt no longer wishes to be seen at clinic here due to distance and inconvenience.  Pt also stated that last echo (07/2024) has left her with extreme neck pain and is refusing to do another echo in the future.  Gave pt other clinics in her area if she decided to change her mind at any point.  Marlena Graves

## 2025-03-05 ENCOUNTER — TELEPHONE (OUTPATIENT)
Dept: CARDIOLOGY | Facility: CLINIC | Age: 85
End: 2025-03-05
Payer: MEDICARE

## 2025-03-05 NOTE — TELEPHONE ENCOUNTER
Called and lvm for pt to call back and get 1yr follow up with [facility] and echo scheduled in August.  Marlena Graves  [facility]   (W) [phone]  Spoke to pt and she would like to decline the echo due to severe neck pain she states is associated from the last echo.  Sending this to [facility] and closing this encounter.  Marlena Graves  [facility]   (W) [phone]

## 2025-04-14 ENCOUNTER — OFFICE VISIT (OUTPATIENT)
Dept: FAMILY MEDICINE | Facility: OTHER | Age: 85
End: 2025-04-14
Attending: FAMILY MEDICINE
Payer: MEDICARE

## 2025-04-14 ENCOUNTER — HOSPITAL ENCOUNTER (OUTPATIENT)
Dept: GENERAL RADIOLOGY | Facility: OTHER | Age: 85
Discharge: HOME | End: 2025-04-14
Attending: FAMILY MEDICINE
Payer: MEDICARE

## 2025-04-14 VITALS
SYSTOLIC BLOOD PRESSURE: 164 MMHG | BODY MASS INDEX: 23.26 KG/M2 | RESPIRATION RATE: 14 BRPM | WEIGHT: 126.4 LBS | DIASTOLIC BLOOD PRESSURE: 102 MMHG | HEART RATE: 76 BPM | OXYGEN SATURATION: 97 % | HEIGHT: 62 IN

## 2025-04-14 DIAGNOSIS — M62.838 TRAPEZIUS MUSCLE SPASM: ICD-10-CM

## 2025-04-14 DIAGNOSIS — M54.2 CHRONIC NECK PAIN: Primary | ICD-10-CM

## 2025-04-14 DIAGNOSIS — I34.1 MVP (MITRAL VALVE PROLAPSE): ICD-10-CM

## 2025-04-14 DIAGNOSIS — I34.0 NONRHEUMATIC MITRAL VALVE REGURGITATION: ICD-10-CM

## 2025-04-14 DIAGNOSIS — I10 PRIMARY HYPERTENSION: ICD-10-CM

## 2025-04-14 DIAGNOSIS — M54.2 CHRONIC NECK PAIN: ICD-10-CM

## 2025-04-14 DIAGNOSIS — G89.29 CHRONIC NECK PAIN: ICD-10-CM

## 2025-04-14 DIAGNOSIS — G89.29 CHRONIC NECK PAIN: Primary | ICD-10-CM

## 2025-04-14 PROCEDURE — 1125F AMNT PAIN NOTED PAIN PRSNT: CPT | Performed by: FAMILY MEDICINE

## 2025-04-14 PROCEDURE — 72040 X-RAY EXAM NECK SPINE 2-3 VW: CPT

## 2025-04-14 PROCEDURE — G2211 COMPLEX E/M VISIT ADD ON: HCPCS | Performed by: FAMILY MEDICINE

## 2025-04-14 PROCEDURE — 99215 OFFICE O/P EST HI 40 MIN: CPT | Performed by: FAMILY MEDICINE

## 2025-04-14 PROCEDURE — 3080F DIAST BP >= 90 MM HG: CPT | Performed by: FAMILY MEDICINE

## 2025-04-14 PROCEDURE — G0463 HOSPITAL OUTPT CLINIC VISIT: HCPCS

## 2025-04-14 PROCEDURE — 3077F SYST BP >= 140 MM HG: CPT | Performed by: FAMILY MEDICINE

## 2025-04-14 RX ORDER — LISINOPRIL 10 MG/1
10 TABLET ORAL DAILY
Qty: 90 TABLET | Refills: 3 | Status: SHIPPED | OUTPATIENT
Start: 2025-04-14

## 2025-04-14 ASSESSMENT — PAIN SCALES - GENERAL: PAINLEVEL_OUTOF10: MILD PAIN (2)

## 2025-04-14 NOTE — PATIENT INSTRUCTIONS
Start lisinopril 10 mg once daily in the morning for your blood pressure    Start checking your BP at least a few days per week and keep a log.  Bring these to your next appointment with me in 2 months.  Change the batteries in your machine before you start using it.    Neck x-rays today.    Start PT, I placed a referral.  This will help.  It is important to do your exercises that they give you daily at home.    You should be contacted by cardiology to schedule your echo here (through the chest wall like you have had in the past) before you see cardiology on August 4.

## 2025-04-14 NOTE — NURSING NOTE
"Chief Complaint   Patient presents with    Neck Pain       Initial BP (!) 164/102   Pulse 76   Resp 14   Ht 1.575 m (5' 2\")   Wt 57.3 kg (126 lb 6.4 oz)   SpO2 97%   Breastfeeding No   BMI 23.12 kg/m   Estimated body mass index is 23.12 kg/m  as calculated from the following:    Height as of this encounter: 1.575 m (5' 2\").    Weight as of this encounter: 57.3 kg (126 lb 6.4 oz).  Medication Review: complete    The next two questions are to help us understand your food security.  If you are feeling you need any assistance in this area, we have resources available to support you today.          12/13/2024   SDOH- Food Insecurity   Within the past 12 months, did you worry that your food would run out before you got money to buy more? N   Within the past 12 months, did the food you bought just not last and you didn t have money to get more? N         Health Care Directive:  Patient has a Health Care Directive on file      Wendy Redding LPN      "

## 2025-04-14 NOTE — Clinical Note
BETSYI - she was not willing to repeat JENSEN but it looks like only TTE was recommended for recheck.  Likely the neck positioning for the JENSEN triggered muscle spasm and arthritic symptoms in her neck which has persisted since the procedure in August but she has done just fine with TTE and is agreeable to repeat. If your staff can contact her to schedule that again in [place], she will do it before your appointment with her in August!  Thanks! Bhavana Thompson

## 2025-04-14 NOTE — PROGRESS NOTES
Assessment & Plan     (M54.2,  G89.29) Chronic neck pain  (primary encounter diagnosis)  (M62.297) Trapezius muscle spasm  Comment: She almost certainly has arthritis in her neck, though the primary source of current complaints is significant spasm throughout the upper trapezius muscle on the left which has been persistent for 8 months now.  Strongly encourage physical therapy which she is agreeable to.  Consider dry needling.  Could consider radiology referral for trigger point injections as well depending on response to PT.  Consider MRI if not improving.  Plan: XR Cervical Spine 2/3 Views, Physical Therapy          Referral    (I10) Primary hypertension  Comment: Progressively increasing over the past year in particular.  Discussed goal of less than 130/80 or at least less than 135/80.  Recommend starting lisinopril 10 mg daily which she is agreeable to.  She does have a blood pressure cuff at home and she will start checking her blood pressure regularly and bring her blood pressure log to her next visit.  Plan: lisinopril (ZESTRIL) 10 MG tablet    (I34.0) Nonrheumatic mitral valve regurgitation  (I34.1) MVP (mitral valve prolapse)  Comment: Follow-up with cardiology and repeat TTE in August.  She is agreeable to this.  I did send a message to the cardiology provider she will be seeing in August and informed her that the patient is agreeable to a TTE so that it can be scheduled in advance of her appointment.    Patient Instructions   Start lisinopril 10 mg once daily in the morning for your blood pressure    Start checking your BP at least a few days per week and keep a log.  Bring these to your next appointment with me in 2 months.  Change the batteries in your machine before you start using it.    Neck x-rays today.    Start PT, I placed a referral.  This will help.  It is important to do your exercises that they give you daily at home.    You should be contacted by cardiology to schedule your echo  here (through the chest wall like you have had in the past) before you see cardiology on August 4.    Return in about 2 months (around 6/14/2025), or if symptoms worsen or fail to improve, for Establish Care Visit, Medication Check/Recheck.    Sary Pop is a 84 year old, presenting for the following health issues:  Neck Pain      4/14/2025    12:35 PM   Additional Questions   Roomed by Lissette JACKSON LPN   Accompanied by N/A     History of Present Illness       Reason for visit:  Neck pain    She eats 2-3 servings of fruits and vegetables daily.She consumes 0 sweetened beverage(s) daily.She exercises with enough effort to increase her heart rate 30 to 60 minutes per day.  She exercises with enough effort to increase her heart rate 4 days per week.   She is taking medications regularly.        Very pleasant 84-year-old female presents to clinic for evaluation of neck stiffness and pain that has persisted since she had a JENSEN in August 2024.  She believes that the way they had to position her neck for the procedure brought on the problem.  She did not have any difficulty with chronic neck pain even intermittently prior to that.  Pain is constant and primarily through the ropey muscle from the base of the skull on the left side down the neck and out through the trapezius superiorly.  Symptoms worsen with rotation of the head side-to-side, but particularly to the left.  She has minimal discomfort with neck flexion and extension.  It is not interfering with sleep.  She is not taking any medications for this.  She tried ice and heat but is unsure if they really helped very much so she has not continued those.  She has not yet attended physical therapy.  She used to see a chiropractor many years ago intermittently and had x-rays of her entire spine.  She reports that the chiropractor told her that she broke her neck in the past.  She was not aware of that but later recalled that when she was in sixth grade she was  "sliding down a hill on at [place] and hit a tree which caused her to pass out and broke her nose.  She suspects that this is likely when she broke her neck.  She did not really have any arthritic pain in the neck before last summer.  She has not had any imaging recently.  She never gets pain radiating down her arm.  She denies any associated numbness, paresthesias, nor weakness in the arm or hand.    She is scheduled for cardiology follow-up on August 4.  The JENSEN was recommended for evaluation of mitral valve regurgitation and prolapse.  She does have moderate regurgitation but they did not recommend intervention at this point.  It was recommended that she have follow-up and repeat TTE in 1 year.  When they called her to schedule that, she declined because the JENSEN triggered her neck problems, but she did not realize that they only needed her to repeat the TTE.  She has had a TTE many times in the past and has tolerated that fine and is more than agreeable to repeating that.  Her blood pressure is quite elevated today.  In review, her blood pressure has consistently been above goal for a year and at least intermittently above goal prior to that.  She has never been on chronic prescription medication, but would be willing to start a medication to improve her blood pressure control.  She stays quite active at baseline and walks regularly.  She eats a healthy balanced diet.    She reports that it is often difficult to get into see her current PCP and she may like to establish care with me.  We will schedule that in 2 months for her follow-up.              Review of Systems  Pertinent positives and negatives as per HPI, otherwise negative.        Objective    BP (!) 164/102   Pulse 76   Resp 14   Ht 1.575 m (5' 2\")   Wt 57.3 kg (126 lb 6.4 oz)   SpO2 97%   Breastfeeding No   BMI 23.12 kg/m    Body mass index is 23.12 kg/m .  Physical Exam     General: alert and oriented x 3, no acute distress, pleasant, " conversant  Head: normocephalic, atraumatic  Lungs: clear to auscultation, no wheezes, rhonchi or rales, no increased work of breathing  Heart: regular rate and rhythm, murmur present  Skin: no abnormal lesions or rash  Musculoskeletal/Extremities: neck ROM severely restricted in side-to-side rotation left more than right with increased pain, flexion and extension are intact, there is tenderness and palpable spasm/tightness throughout the upper trapezius muscle on the left from the base of the skull to 2/3 of the way to the shoulder, right side is nontender and without spasm, otherwise good ROM throughout, no peripheral edema  Neuro: no gross focal deficits            I spent a total of 45 minutes on the patient's care today including preparing for the visit, the visit, documentation, and follow-up care. This does not include any procedure time.    The longitudinal plan of care for the diagnosis(es)/condition(s) as documented were addressed during this visit. Due to the added complexity in care, I will continue to support Kiesha in the subsequent management and with ongoing continuity of care.    Signed Electronically by: Bhavana Thompson MD

## 2025-05-13 ENCOUNTER — THERAPY VISIT (OUTPATIENT)
Dept: PHYSICAL THERAPY | Facility: OTHER | Age: 85
End: 2025-05-13
Attending: FAMILY MEDICINE
Payer: MEDICARE

## 2025-05-13 DIAGNOSIS — M54.2 CHRONIC NECK PAIN: ICD-10-CM

## 2025-05-13 DIAGNOSIS — M62.838 TRAPEZIUS MUSCLE SPASM: ICD-10-CM

## 2025-05-13 DIAGNOSIS — G89.29 CHRONIC NECK PAIN: ICD-10-CM

## 2025-05-14 NOTE — PROGRESS NOTES
PHYSICAL THERAPY EVALUATION  Type of Visit: Evaluation       Fall Risk Screen:  Have you fallen 2 or more times in the past year?: No  Have you fallen and had an injury in the past year?: No  Is patient receiving Physical Therapy Services?: Yes    Subjective Patient is a retired 85 year old female who's chief complaint is chronic pain in her left shoulder and cervical spine. Her pain today is 3/10 described as irritating and she would like it to go away. She reports problems with pain, loss of movement, and stiffness. Injury occurred in childhood with hitting a tree while downhill sledding but never caused cervical pain. In adulthood pt had imaging completed for neck pain which indicated an old gerda cervical fracture. She had an echo done in July of 2024 and post procedure developed left sided cervical pain. Patient reports this problem has happened before. Patient reports lowest pain is 5/10 and highest is 9/10. She described her pain in the morning as achy and stiff and can feel it the most when she turns to the left. As her day continues pain and mobility are unchanged. She reports she is sore all day. Pain is described as sharp with movement, and aching and occurs all the time. Patient's pain is made worse with certain positions, household tasks and work tasks, showering causes pain in her shoulder. Pain is improved with sitting, rest, and certain positions. She has tried heat and it felt good, but has not tried ice. PMH includes arthritis and removal of uterus in her 50's. Patient reports no falls in the past year. Patient shares her pain does not wake her often, sometimes when she turns her head to the left it will wake her. She sleeps on her back in a bed with two down pillows to prop her neck into a comfortable position. Patient shared she walks 1-2 miles per day.         Presenting condition or subjective complaint:  Chronic neck pain (M54.2, G89.29), Trapezius muscle spasm (M62.838)   Date of onset:   (About nine months ago after having an echo.)    Relevant medical history:     Dates & types of surgery:   Echo July 2024      Prior Level of Function  Transfers: Independent  Ambulation: Independent  ADL: Independent  IADL: Driving, Finances, Housekeeping, Laundry, Meal preparation, Yard work    Living Environment    Employment:    Retired   Patient goals for therapy:   For pain to go away.     Objective   CERVICAL SPINE EVALUATION  PAIN: Pain Level at Rest: 5/10  Pain Level with Use: 9/10  Pain Location: shoulder and neck  Pain Quality: Aching and Sharp  Pain Frequency: constant  Pain is Worst: daytime or nighttime  Pain is Exacerbated By: certain positions, household work, work tasks.  Pain is Relieved By: rest and certain positions, and sitting.  Pain Progression: Unchanged    POSTURE: Rounded shoulders, forward head. mild cervical left lateral sidebend.    ROM: Cervical: Flex 45*, Ext 28*, Rotation: R38*, L 55*, Lateral Sidebend: R 20*, L 23*    SPECIAL TESTS: Right +, Left +, pain location left side.  PALPATION: Bilateral: upper trapezius, levator, scalenes. Left supraspinatus. Left musculature tight with tenderness, predominantly upper trapezius.Supine flexion rotation, right significantly more limited with pain at left upper trap. Left mild limitation.      Assessment & Plan   CLINICAL IMPRESSIONS  Medical Diagnosis: Chronic neck pain (M54.2, G89.29), Trapezius muscle spasm (M62.838)    Treatment Diagnosis:     Impression/Assessment: Patient is a 85 year old female with chronic neck pain complaints.  The following significant findings have been identified: Decreased ROM/flexibility, Decreased joint mobility, and Dizziness. These impairments interfere with their ability to perform self care tasks, work tasks, household chores, and driving  as compared to previous level of function.     Clinical Decision Making (Complexity):  Clinical Presentation: Evolving/Changing  Clinical Presentation Rationale: based on  medical and personal factors listed in PT evaluation  Clinical Decision Making (Complexity): Moderate complexity    PLAN OF CARE  Treatment Interventions:  Modalities: Cryotherapy, Dry Needling, E-stim, Hot Pack, Ultrasound  Interventions: Manual Therapy, Therapeutic Activity, Therapeutic Exercise    Long Term Goals     PT Goal 1  Goal Identifier: Sleep  Goal Description: Patient will reduce cervical and shoulder pain to no greater than 2/10 to sleep 4 consecutive hours in 4 weeks.  Rationale: to maximize safety and independence with performance of ADLs and functional tasks;to maximize safety and independence within the home;to maximize safety and independence within the community;to maximize safety and independence with self cares  Target Date: 06/10/25  PT Goal 2  Goal Identifier: HEP  Goal Description: Patient will demonstrate completion of HEP 4 x per week in order to advance her therapy in 6 weeks.  Rationale: to maximize safety and independence with performance of ADLs and functional tasks;to maximize safety and independence within the home;to maximize safety and independence within the community;to maximize safety and independence with transportation;to maximize safety and independence with self cares  Target Date: 06/24/25  PT Goal 3  Goal Identifier: ROM  Goal Description: Patient will demonstrate 70* cervical rotation bilaterally in order to turn their head and drive a vehicle safely in the next 12 weeks.  Rationale: to maximize safety and independence with performance of ADLs and functional tasks;to maximize safety and independence with transportation  Target Date: 08/05/25      Frequency of Treatment: 2 times per week  Duration of Treatment: 12 weeks    Education Assessment:   Learner/Method: Patient;Listening;Reading;Demonstration;Pictures/Video    Risks and benefits of evaluation/treatment have been explained.   Patient/Family/caregiver agrees with Plan of Care.     Evaluation Time:     PT Mohit  Moderate Complexity Minutes (06213): 20       Signing Clinician: WILLIS IBARRA, PT        [facility]                                                                                   OUTPATIENT PHYSICAL THERAPY      PLAN OF TREATMENT FOR OUTPATIENT REHABILITATION   Patient's Last Name, First Name, Kiesha Pascual YOB: 1940   Provider's Name   [facility]   Medical Record No.  [id number]     Onset Date:  (About nine months ago after having an echo.)  Start of Care Date: 05/13/25     Medical Diagnosis:  Chronic neck pain (M54.2, G89.29), Trapezius muscle spasm (M62.838)      PT Treatment Diagnosis:    Plan of Treatment  Frequency/Duration: 2 times per week/ 12 weeks    Certification date from 05/13/25 to 08/05/25         See note for plan of treatment details and functional goals     WILLIS IBARRA, PT                         I CERTIFY THE NEED FOR THESE SERVICES FURNISHED UNDER        THIS PLAN OF TREATMENT AND WHILE UNDER MY CARE     (Physician attestation of this document indicates review and certification of the therapy plan).              Referring Provider:  Bhavana Thompson    Initial Assessment  See Epic Evaluation- Start of Care Date: 05/13/25

## 2025-05-19 ENCOUNTER — THERAPY VISIT (OUTPATIENT)
Dept: PHYSICAL THERAPY | Facility: OTHER | Age: 85
End: 2025-05-19
Attending: FAMILY MEDICINE
Payer: MEDICARE

## 2025-05-19 DIAGNOSIS — M54.2 CHRONIC NECK PAIN: ICD-10-CM

## 2025-05-19 DIAGNOSIS — M62.838 TRAPEZIUS MUSCLE SPASM: Primary | ICD-10-CM

## 2025-05-19 DIAGNOSIS — G89.29 CHRONIC NECK PAIN: ICD-10-CM

## 2025-05-22 ENCOUNTER — THERAPY VISIT (OUTPATIENT)
Dept: PHYSICAL THERAPY | Facility: OTHER | Age: 85
End: 2025-05-22
Attending: FAMILY MEDICINE
Payer: MEDICARE

## 2025-05-22 DIAGNOSIS — M54.2 CHRONIC NECK PAIN: ICD-10-CM

## 2025-05-22 DIAGNOSIS — M62.838 TRAPEZIUS MUSCLE SPASM: Primary | ICD-10-CM

## 2025-05-22 DIAGNOSIS — G89.29 CHRONIC NECK PAIN: ICD-10-CM

## 2025-05-28 ENCOUNTER — THERAPY VISIT (OUTPATIENT)
Dept: PHYSICAL THERAPY | Facility: OTHER | Age: 85
End: 2025-05-28
Attending: FAMILY MEDICINE
Payer: MEDICARE

## 2025-05-28 DIAGNOSIS — M54.2 CHRONIC NECK PAIN: ICD-10-CM

## 2025-05-28 DIAGNOSIS — G89.29 CHRONIC NECK PAIN: ICD-10-CM

## 2025-05-28 DIAGNOSIS — M62.838 TRAPEZIUS MUSCLE SPASM: Primary | ICD-10-CM

## 2025-06-03 ENCOUNTER — THERAPY VISIT (OUTPATIENT)
Dept: PHYSICAL THERAPY | Facility: OTHER | Age: 85
End: 2025-06-03
Attending: FAMILY MEDICINE
Payer: MEDICARE

## 2025-06-03 DIAGNOSIS — M62.838 TRAPEZIUS MUSCLE SPASM: Primary | ICD-10-CM

## 2025-06-03 DIAGNOSIS — G89.29 CHRONIC NECK PAIN: ICD-10-CM

## 2025-06-03 DIAGNOSIS — M54.2 CHRONIC NECK PAIN: ICD-10-CM

## 2025-06-05 ENCOUNTER — THERAPY VISIT (OUTPATIENT)
Dept: PHYSICAL THERAPY | Facility: OTHER | Age: 85
End: 2025-06-05
Attending: FAMILY MEDICINE
Payer: MEDICARE

## 2025-06-05 DIAGNOSIS — G89.29 CHRONIC NECK PAIN: ICD-10-CM

## 2025-06-05 DIAGNOSIS — M62.838 TRAPEZIUS MUSCLE SPASM: Primary | ICD-10-CM

## 2025-06-05 DIAGNOSIS — M54.2 CHRONIC NECK PAIN: ICD-10-CM

## 2025-06-10 ENCOUNTER — THERAPY VISIT (OUTPATIENT)
Dept: PHYSICAL THERAPY | Facility: OTHER | Age: 85
End: 2025-06-10
Attending: FAMILY MEDICINE
Payer: MEDICARE

## 2025-06-10 DIAGNOSIS — M62.838 TRAPEZIUS MUSCLE SPASM: Primary | ICD-10-CM

## 2025-06-10 DIAGNOSIS — G89.29 CHRONIC NECK PAIN: ICD-10-CM

## 2025-06-10 DIAGNOSIS — M54.2 CHRONIC NECK PAIN: ICD-10-CM

## 2025-06-12 ENCOUNTER — THERAPY VISIT (OUTPATIENT)
Dept: PHYSICAL THERAPY | Facility: OTHER | Age: 85
End: 2025-06-12
Attending: FAMILY MEDICINE
Payer: MEDICARE

## 2025-06-12 DIAGNOSIS — G89.29 CHRONIC NECK PAIN: ICD-10-CM

## 2025-06-12 DIAGNOSIS — M54.2 CHRONIC NECK PAIN: ICD-10-CM

## 2025-06-12 DIAGNOSIS — M62.838 TRAPEZIUS MUSCLE SPASM: Primary | ICD-10-CM

## 2025-06-16 ENCOUNTER — OFFICE VISIT (OUTPATIENT)
Dept: FAMILY MEDICINE | Facility: OTHER | Age: 85
End: 2025-06-16
Attending: FAMILY MEDICINE
Payer: MEDICARE

## 2025-06-16 VITALS
RESPIRATION RATE: 18 BRPM | DIASTOLIC BLOOD PRESSURE: 72 MMHG | WEIGHT: 125.6 LBS | SYSTOLIC BLOOD PRESSURE: 122 MMHG | HEIGHT: 62 IN | TEMPERATURE: 97.6 F | OXYGEN SATURATION: 97 % | HEART RATE: 76 BPM | BODY MASS INDEX: 23.11 KG/M2

## 2025-06-16 DIAGNOSIS — I10 PRIMARY HYPERTENSION: ICD-10-CM

## 2025-06-16 DIAGNOSIS — E78.00 PURE HYPERCHOLESTEROLEMIA: ICD-10-CM

## 2025-06-16 DIAGNOSIS — G89.29 CHRONIC NECK PAIN: ICD-10-CM

## 2025-06-16 DIAGNOSIS — M85.89 OSTEOPENIA OF MULTIPLE SITES: ICD-10-CM

## 2025-06-16 DIAGNOSIS — M62.838 TRAPEZIUS MUSCLE SPASM: ICD-10-CM

## 2025-06-16 DIAGNOSIS — Z76.89 ENCOUNTER TO ESTABLISH CARE: Primary | ICD-10-CM

## 2025-06-16 DIAGNOSIS — I34.1 MVP (MITRAL VALVE PROLAPSE): ICD-10-CM

## 2025-06-16 DIAGNOSIS — M54.2 CHRONIC NECK PAIN: ICD-10-CM

## 2025-06-16 DIAGNOSIS — M47.812 CERVICAL SPINE ARTHRITIS: ICD-10-CM

## 2025-06-16 DIAGNOSIS — M43.12 ANTEROLISTHESIS OF CERVICAL SPINE: ICD-10-CM

## 2025-06-16 DIAGNOSIS — I34.0 NONRHEUMATIC MITRAL VALVE REGURGITATION: ICD-10-CM

## 2025-06-16 PROCEDURE — G0463 HOSPITAL OUTPT CLINIC VISIT: HCPCS

## 2025-06-16 ASSESSMENT — ANXIETY QUESTIONNAIRES
4. TROUBLE RELAXING: NOT AT ALL
5. BEING SO RESTLESS THAT IT IS HARD TO SIT STILL: NOT AT ALL
3. WORRYING TOO MUCH ABOUT DIFFERENT THINGS: NOT AT ALL
GAD7 TOTAL SCORE: 0
IF YOU CHECKED OFF ANY PROBLEMS ON THIS QUESTIONNAIRE, HOW DIFFICULT HAVE THESE PROBLEMS MADE IT FOR YOU TO DO YOUR WORK, TAKE CARE OF THINGS AT HOME, OR GET ALONG WITH OTHER PEOPLE: NOT DIFFICULT AT ALL
2. NOT BEING ABLE TO STOP OR CONTROL WORRYING: NOT AT ALL
8. IF YOU CHECKED OFF ANY PROBLEMS, HOW DIFFICULT HAVE THESE MADE IT FOR YOU TO DO YOUR WORK, TAKE CARE OF THINGS AT HOME, OR GET ALONG WITH OTHER PEOPLE?: NOT DIFFICULT AT ALL
GAD7 TOTAL SCORE: 0
7. FEELING AFRAID AS IF SOMETHING AWFUL MIGHT HAPPEN: NOT AT ALL
GAD7 TOTAL SCORE: 0
7. FEELING AFRAID AS IF SOMETHING AWFUL MIGHT HAPPEN: NOT AT ALL
1. FEELING NERVOUS, ANXIOUS, OR ON EDGE: NOT AT ALL
6. BECOMING EASILY ANNOYED OR IRRITABLE: NOT AT ALL

## 2025-06-16 ASSESSMENT — PAIN SCALES - PAIN ENJOYMENT GENERAL ACTIVITY SCALE (PEG)
AVG_PAIN_PASTWEEK: 3
INTERFERED_GENERAL_ACTIVITY: 0
PEG_TOTALSCORE: 2
INTERFERED_GENERAL_ACTIVITY: 0 - DOES NOT INTERFERE
PEG_TOTALSCORE: 2
AVG_PAIN_PASTWEEK: 3
INTERFERED_ENJOYMENT_LIFE: 3
INTERFERED_ENJOYMENT_LIFE: 3

## 2025-06-16 ASSESSMENT — PATIENT HEALTH QUESTIONNAIRE - PHQ9
SUM OF ALL RESPONSES TO PHQ QUESTIONS 1-9: 0
10. IF YOU CHECKED OFF ANY PROBLEMS, HOW DIFFICULT HAVE THESE PROBLEMS MADE IT FOR YOU TO DO YOUR WORK, TAKE CARE OF THINGS AT HOME, OR GET ALONG WITH OTHER PEOPLE: NOT DIFFICULT AT ALL
SUM OF ALL RESPONSES TO PHQ QUESTIONS 1-9: 0

## 2025-06-16 NOTE — NURSING NOTE
"Chief Complaint   Patient presents with    Establish Care    Follow Up     Neck/Neck Pain    Hypertension     Follow Up Blood Pressure        Initial /72 (BP Location: Right arm, Patient Position: Chair, Cuff Size: Adult Regular)   Pulse 76   Temp 97.6  F (36.4  C) (Temporal)   Resp 18   Ht 1.575 m (5' 2\")   Wt 57 kg (125 lb 9.6 oz)   SpO2 97%   BMI 22.97 kg/m   Estimated body mass index is 22.97 kg/m  as calculated from the following:    Height as of this encounter: 1.575 m (5' 2\").    Weight as of this encounter: 57 kg (125 lb 9.6 oz).    Patient reports she is following up on her neck/shoulder pain. Also on blood pressure. As of today, she reports no noted side effects from the lisinopril. Is periodically checking blood pressure at home.       Medication Reconciliation: Complete.       Lissette Haider LPN on 6/16/2025 at 12:48 PM     "

## 2025-06-16 NOTE — PROGRESS NOTES
Assessment & Plan     (Z76.89) Encounter to establish care  (primary encounter diagnosis)  Comment:     (M54.2,  G89.29) Chronic neck pain  (M62.838) Trapezius muscle spasm  (M47.812) Cervical spine arthritis  (M43.12) Anterolisthesis of cervical spine  Comment: Although she has fairly advanced degenerative changes of the cervical spine on x-ray, symptoms are clearly largely muscular in nature with no typical radicular symptoms and no associated neurologic deficits.  Fortunately she is making good progress in physical therapy and will continue that.  Reiterated the importance of continuing her home exercise program upon completion of formal physical therapy which she is agreeable to.  Return precautions discussed.  Would consider MRI and possible referral for injections if warranted based on symptoms in the future.    (I10) Primary hypertension  Comment: Well-controlled since starting lisinopril, continue.    (E78.00) Pure hypercholesterolemia  Comment: Mild and stable, not on medication.  Monitor annually.    (I34.0) Nonrheumatic mitral valve regurgitation  (I34.1) MVP (mitral valve prolapse)  Comment: Echocardiogram scheduled in July.  Continue annual cardiology follow-up.    (M85.89) Osteopenia of multiple sites  Comment: Continue calcium with vitamin D daily, calcium rich diet, and regular weightbearing exercise.  Repeat DEXA every 2 to 3 years.    Follow-up   Return in about 6 months (around 12/16/2025), or if symptoms worsen or fail to improve, for AWV, Chronic Disease Management.        Subjective   Kiesha is a 85 year old, presenting for the following health issues:  Establish Care, Follow Up (Neck/Neck Pain), and Hypertension (Follow Up Blood Pressure )        6/16/2025    12:41 PM   Additional Questions   Roomed by Lissette JACKSON LPN   Accompanied by N/A     History of Present Illness       Reason for visit:  Neck pain    She eats 2-3 servings of fruits and vegetables daily.She consumes 1 sweetened  beverage(s) daily.She exercises with enough effort to increase her heart rate 30 to 60 minutes per day.  She exercises with enough effort to increase her heart rate 5 days per week.   She is taking medications regularly.    Very pleasant 85-year-old female presents to clinic to establish care with myself as her primary care provider and for follow-up.  Her medical history was reviewed and updated.  Her last DEXA was in February 2025 with osteopenia, lowest T-score -1.7.  She takes calcium with vitamin D daily and has a calcium rich diet.    At her last visit with me in April she complained of neck stiffness and pain that has persisted since she had a JENSEN in August 2024.  She believes that the way they had to position her neck for the procedure brought on the problem.  She did not have any difficulty with chronic neck pain even intermittently prior to that.  Pain is constant and was initially throughout the left neck musculature and superior trapezius muscle that would worsen with turning her head.  Symptoms do not worsen with neck flexion and extension.  Symptoms do not interfere with sleep.  She had not been taking any medications for this.  Ice and heat really did not help so she is no longer doing them.  An x-ray was completed showing significant arthritic changes particularly C4-6 and with 7 mm of anterolisthesis of C7 relative to T1.  She reports that when she was a child she was sliding down a hill and hit a tree with her head.  She believes that she probably injured her neck at that time but had not been having neck pain until last summer.  She was referred to physical therapy and has been consistently participating in that as well as doing her home exercise program at least twice daily.  Progress has been slow but she does feel like this is helping.  She now has no pain at complete rest particularly with her head in a neutral position.  She continues to have tightness on the left side and is restricted in  how far she can turn her head to the left but her range of motion has improved.  She does still get pain with attempting to turn her head to the left though it is less severe than it had been.  She plans to continue physical therapy for at least several more weeks and has several appointments already scheduled.  She never gets pain radiating down the arm and denies associated numbness, paresthesias, or weakness in the arm or hand.     She is scheduled for cardiology follow-up on August 4.  The JENSEN in August 2024 was recommended for evaluation of mitral valve regurgitation and prolapse.  She does have moderate regurgitation but they did not recommend intervention at this point.  It was recommended that she have follow-up and repeat TTE in 1 year.  When they called her to schedule that, she declined because the JENSEN triggered her neck problems, but she did not realize that they only needed her to repeat the TTE.  She has had a TTE many times in the past and has tolerated that fine and was more than agreeable to repeating that which is now scheduled in July.  She has mild hyperlipidemia which has been stable and she is not on medication for this.  Her blood pressure had been above goal consistently for about a year by review of clinic vitals at her last visit in April 2025.  She was agreeable to starting lisinopril and has tolerated that well.  Her home blood pressures have consistently been in the 110-120's systolic and mostly 70s diastolic.  Now she is only checking this about once per week.  She denies any undesired side effects from this medication.  She stays quite active at baseline and walks regularly.  She eats a healthy balanced diet.  She lives in an association in [place] where she has several friends that she walks with and socializes with regularly.  She continues to drive and feels very comfortable and able to do that independently.  She is independent in all of her activities of daily living.  She denies any  "other concerns or complaints.            Hypertension Follow-up    Do you check your blood pressure regularly outside of the clinic? Yes   Are you following a low salt diet? No  Are your blood pressures ever more than 140 on the top number (systolic) OR more   than 90 on the bottom number (diastolic), for example 140/90? No    BP Readings from Last 2 Encounters:   06/16/25 122/72   04/14/25 (!) 164/102           Review of Systems  Pertinent positives and negatives as per HPI, otherwise negative.        Objective    /72 (BP Location: Right arm, Patient Position: Chair, Cuff Size: Adult Regular)   Pulse 76   Temp 97.6  F (36.4  C) (Temporal)   Resp 18   Ht 1.575 m (5' 2\")   Wt 57 kg (125 lb 9.6 oz)   SpO2 97%   BMI 22.97 kg/m    Body mass index is 22.97 kg/m .  Physical Exam     General: alert and oriented x 3, no acute distress, pleasant, conversant  Head: normocephalic, atraumatic  Ears: tympanic membranes pearly gray, canals clear  Eyes: pupils equal, round, and reactive to light, conjunctiva pink, sclera white  Oropharynx: pink without tonsillar enlargement nor exudate  Neck: supple without lymphadenopathy, thyroid not enlarged, no JVD or carotid bruits  Lungs: clear to auscultation, no wheezes, rhonchi or rales, no increased work of breathing  Heart: regular rate and rhythm, murmur present  Abdomen: soft, nontender, nondistended  Skin: no abnormal lesions or rash  Musculoskeletal/Extremities: tenderness with palpation throughout the left superior trapezius muscle with tightness/spasm associated, mild tenderness palpation of the left cervical paraspinal muscles, cervical range of motion is limited with turning her head to the left but relatively preserved otherwise, trace bilateral ankle edema  Neuro: no gross focal deficits    Colonoscopy not indicated for screening purposes based on age  Pap Smear not indicated for screening purposes based on age  Mammogram not indicated for screening purposes based " on age  Dexa scan repeat 2028  Labs reviewed  Influenza recommended annually  PCV done  Pneumovax done  Shingrix done  Tdap/Td due 2029  COVID declined  RSV due            I spent a total of 49 minutes on the patient's care today including preparing for the visit, the visit, documentation, and follow-up care. This does not include any procedure time.    The longitudinal plan of care for the diagnosis(es)/condition(s) as documented were addressed during this visit. Due to the added complexity in care, I will continue to support Kiesha in the subsequent management and with ongoing continuity of care.    Signed Electronically by: Bhavana Thompson MD

## 2025-06-18 ENCOUNTER — THERAPY VISIT (OUTPATIENT)
Dept: PHYSICAL THERAPY | Facility: OTHER | Age: 85
End: 2025-06-18
Attending: FAMILY MEDICINE
Payer: MEDICARE

## 2025-06-18 DIAGNOSIS — M54.2 CHRONIC NECK PAIN: ICD-10-CM

## 2025-06-18 DIAGNOSIS — G89.29 CHRONIC NECK PAIN: ICD-10-CM

## 2025-06-18 DIAGNOSIS — M62.838 TRAPEZIUS MUSCLE SPASM: Primary | ICD-10-CM

## 2025-06-23 ENCOUNTER — THERAPY VISIT (OUTPATIENT)
Dept: PHYSICAL THERAPY | Facility: OTHER | Age: 85
End: 2025-06-23
Attending: FAMILY MEDICINE
Payer: MEDICARE

## 2025-06-23 DIAGNOSIS — G89.29 CHRONIC NECK PAIN: ICD-10-CM

## 2025-06-23 DIAGNOSIS — M62.838 TRAPEZIUS MUSCLE SPASM: Primary | ICD-10-CM

## 2025-06-23 DIAGNOSIS — M54.2 CHRONIC NECK PAIN: ICD-10-CM

## 2025-07-01 ENCOUNTER — THERAPY VISIT (OUTPATIENT)
Dept: PHYSICAL THERAPY | Facility: OTHER | Age: 85
End: 2025-07-01
Attending: FAMILY MEDICINE
Payer: MEDICARE

## 2025-07-01 DIAGNOSIS — G89.29 CHRONIC NECK PAIN: ICD-10-CM

## 2025-07-01 DIAGNOSIS — M62.838 TRAPEZIUS MUSCLE SPASM: Primary | ICD-10-CM

## 2025-07-01 DIAGNOSIS — M54.2 CHRONIC NECK PAIN: ICD-10-CM

## 2025-07-01 NOTE — PROGRESS NOTES
PLAN  Continue therapy per current plan of care.    Beginning/End Dates of Progress Note Reporting Period:  05/13/25 to 07/01/2025    Referring Provider:  Bhavana Thompson       07/01/25 0500   Appointment Info   Signing clinician's name / credentials Marlena Julio, PT, DPT   Total/Authorized Visits 10/10   Visits Used 10   Medical Diagnosis Chronic neck pain (M54.2, G89.29), Trapezius muscle spasm (M62.838)   Progress Note/Certification   Start of Care Date 05/13/25   Onset of illness/injury or Date of Surgery   (About nine months ago after having an echo.)   Therapy Frequency 2 times per week   Predicted Duration 12 weeks   Certification date from 05/13/25   Certification date to 08/05/25   Progress Note Completed Date 05/13/25   Supervision   Assistant Supervision PTA visit observed, intervention appropriate, plan of care reviewed and remains appropriate   PT Assistant Visit Number [id number]   PT Goal 1   Goal Identifier Sleep   Goal Description Patient will reduce cervical and shoulder pain to no greater than 2/10 to sleep 4 consecutive hours in 4 weeks.   Rationale to maximize safety and independence with performance of ADLs and functional tasks;to maximize safety and independence within the home;to maximize safety and independence within the community;to maximize safety and independence with self cares   Goal Progress In progress   Target Date 06/10/25   PT Goal 2   Goal Identifier HEP   Goal Description Patient will demonstrate completion of HEP 4 x per week in order to advance her therapy in 6 weeks.   Rationale to maximize safety and independence with performance of ADLs and functional tasks;to maximize safety and independence within the home;to maximize safety and independence within the community;to maximize safety and independence with transportation;to maximize safety and independence with self cares   Goal Progress In progress   Target Date 06/24/25   PT Goal 3   Goal Identifier ROM   Goal Description  Patient will demonstrate 70* cervical rotation bilaterally in order to turn their head and drive a vehicle safely in the next 12 weeks.   Rationale to maximize safety and independence with performance of ADLs and functional tasks;to maximize safety and independence with transportation   Goal Progress In progress   Target Date 08/05/25   Subjective Report   Subjective Report Kiesha reports that neck is still tight. No muscle spasms. Shoulders still hike up and tense throughout the day. Neck continues to have crepitus with certain movements. She reports less pain then when she started therapy.   Objective Measure 1   Objective Measure NDI   Details 6/50   Objective Measure 2   Objective Measure ROM   Details Cervical: Flex 45*, Ext 30*, Rotation: R42*, L 55*, Lateral Sidebend: R 20*, L 23*   Objective Measure 3   Objective Measure Palpation   Details Increased muscle tension in bilateral traps, rhomboids, levator, deep cervicals, SCM, scalenes and upper lat. Continued limitation with cervical mobility with pain and end range.   Objective Measure 4   Objective Measure Spurlings   Details Right +, Left +, pain location left side.   Objective Measure 5   Objective Measure Posture   Details Rounded  shoulders, forward head.  mild cervical  left lateral sidebend.   Mechanical Traction   Mechanical Traction, Minutes (85091) 20   Treatment Detail Supine table 3 ft from floor with traction arm set to 15*. Bolster under both knees.   Traction -Type Cervical   Position supine   Type Intermittent   Duration 15 min   Max poundage 17   Min poundage 11   Hold Time 60 seconds   Rest Time 20 seconds   Steps ramping up 3   Steps ramping down 3   Speed 30%   Ultrasound   Intensity 1.2w/cm2   Duration (does not include the 3-5 min set up/clean up time) 8 min   Frequency 1 MHz   Location L UT and cerv paraspinals   Positioning sitting   Patient Response/Progress tolerating well   Therapeutic Procedure/Exercise   Ther Proc 1 Supine:  chin tucks x 10. Seated Bi Trapezuis stretch x 30 second x 2-(reviewed ex again-discussed completing ex gently not pushing into pain)   Ther Proc 1 - Details review of chin tucks and UT stretching-vc/tc for decreasing intensity and frequency of chin tucks and using pillow under her head to decrease stretch at this time due to increased cervical pain, vc to decrease intensity of UT stretch   Skilled Intervention For appropriate resistance and progression of exercise to promote mobility and strength.   Patient Response/Progress pt had some difficulty recalling reps and frequency of ex even with instructions written on HEP(pt brought in her HEP today)   Manual Therapy   Manual Therapy: Mobilization, MFR, MLD, friction massage minutes (33381) 25   Manual Therapy 1 STM   Manual Therapy 1 - Details STM with medium/deep pressure focusing on areas of higher tension and restriction. Bilateral trapezuis, rhomboids, and levator, cerv paraspinals and suboccipitals.   Skilled Intervention STM in order to decrease pain and improve mobility   Patient Response/Progress improved ROM noted with decreased pain when turning head to the L   Education   Learner/Method Patient;Listening;Reading;Demonstration;Pictures/Video   Plan   Plan for next session traction/STM-possibly trial increase in max #   Total Session Time   Timed Code Treatment Minutes 25   Total Treatment Time (sum of timed and untimed services) 45

## 2025-07-10 ENCOUNTER — THERAPY VISIT (OUTPATIENT)
Dept: PHYSICAL THERAPY | Facility: OTHER | Age: 85
End: 2025-07-10
Attending: FAMILY MEDICINE
Payer: MEDICARE

## 2025-07-10 DIAGNOSIS — G89.29 CHRONIC NECK PAIN: ICD-10-CM

## 2025-07-10 DIAGNOSIS — M54.2 CHRONIC NECK PAIN: ICD-10-CM

## 2025-07-10 DIAGNOSIS — M62.838 TRAPEZIUS MUSCLE SPASM: Primary | ICD-10-CM

## 2025-07-16 ENCOUNTER — THERAPY VISIT (OUTPATIENT)
Dept: PHYSICAL THERAPY | Facility: OTHER | Age: 85
End: 2025-07-16
Attending: FAMILY MEDICINE
Payer: MEDICARE

## 2025-07-16 DIAGNOSIS — G89.29 CHRONIC NECK PAIN: ICD-10-CM

## 2025-07-16 DIAGNOSIS — M62.838 TRAPEZIUS MUSCLE SPASM: Primary | ICD-10-CM

## 2025-07-16 DIAGNOSIS — M54.2 CHRONIC NECK PAIN: ICD-10-CM

## 2025-07-23 ENCOUNTER — THERAPY VISIT (OUTPATIENT)
Dept: PHYSICAL THERAPY | Facility: OTHER | Age: 85
End: 2025-07-23
Attending: FAMILY MEDICINE
Payer: MEDICARE

## 2025-07-23 DIAGNOSIS — G89.29 CHRONIC NECK PAIN: ICD-10-CM

## 2025-07-23 DIAGNOSIS — M54.2 CHRONIC NECK PAIN: ICD-10-CM

## 2025-07-23 DIAGNOSIS — M62.838 TRAPEZIUS MUSCLE SPASM: Primary | ICD-10-CM

## 2025-07-24 ENCOUNTER — HOSPITAL ENCOUNTER (OUTPATIENT)
Dept: CARDIOLOGY | Facility: OTHER | Age: 85
End: 2025-07-24
Attending: NURSE PRACTITIONER
Payer: MEDICARE

## 2025-07-24 DIAGNOSIS — I34.0 NONRHEUMATIC MITRAL VALVE REGURGITATION: ICD-10-CM

## 2025-07-24 LAB — LVEF ECHO: NORMAL

## 2025-07-24 PROCEDURE — 93306 TTE W/DOPPLER COMPLETE: CPT

## 2025-08-04 ENCOUNTER — OFFICE VISIT (OUTPATIENT)
Dept: CARDIOLOGY | Facility: OTHER | Age: 85
End: 2025-08-04
Attending: NURSE PRACTITIONER
Payer: MEDICARE

## 2025-08-04 VITALS
TEMPERATURE: 98.1 F | BODY MASS INDEX: 22.86 KG/M2 | DIASTOLIC BLOOD PRESSURE: 70 MMHG | SYSTOLIC BLOOD PRESSURE: 138 MMHG | WEIGHT: 125 LBS | HEART RATE: 74 BPM | OXYGEN SATURATION: 98 % | RESPIRATION RATE: 16 BRPM

## 2025-08-04 DIAGNOSIS — R01.1 HEART MURMUR: ICD-10-CM

## 2025-08-04 DIAGNOSIS — I34.0 NONRHEUMATIC MITRAL VALVE REGURGITATION: Primary | ICD-10-CM

## 2025-08-04 DIAGNOSIS — I34.1 MVP (MITRAL VALVE PROLAPSE): ICD-10-CM

## 2025-08-04 LAB
ATRIAL RATE - MUSE: 71 BPM
DIASTOLIC BLOOD PRESSURE - MUSE: NORMAL MMHG
INTERPRETATION ECG - MUSE: NORMAL
P AXIS - MUSE: 35 DEGREES
PR INTERVAL - MUSE: 150 MS
QRS DURATION - MUSE: 72 MS
QT - MUSE: 380 MS
QTC - MUSE: 412 MS
R AXIS - MUSE: -25 DEGREES
SYSTOLIC BLOOD PRESSURE - MUSE: NORMAL MMHG
T AXIS - MUSE: 37 DEGREES
VENTRICULAR RATE- MUSE: 71 BPM

## 2025-08-04 PROCEDURE — 99214 OFFICE O/P EST MOD 30 MIN: CPT | Performed by: NURSE PRACTITIONER

## 2025-08-04 PROCEDURE — 93005 ELECTROCARDIOGRAM TRACING: CPT | Performed by: NURSE PRACTITIONER

## 2025-08-04 PROCEDURE — 3075F SYST BP GE 130 - 139MM HG: CPT | Performed by: NURSE PRACTITIONER

## 2025-08-04 PROCEDURE — 1125F AMNT PAIN NOTED PAIN PRSNT: CPT | Performed by: NURSE PRACTITIONER

## 2025-08-04 PROCEDURE — 3078F DIAST BP <80 MM HG: CPT | Performed by: NURSE PRACTITIONER

## 2025-08-04 PROCEDURE — 93010 ELECTROCARDIOGRAM REPORT: CPT | Performed by: INTERNAL MEDICINE

## 2025-08-04 PROCEDURE — G0463 HOSPITAL OUTPT CLINIC VISIT: HCPCS

## 2025-08-04 ASSESSMENT — PAIN SCALES - GENERAL: PAINLEVEL_OUTOF10: MILD PAIN (2)

## 2025-08-06 ENCOUNTER — THERAPY VISIT (OUTPATIENT)
Dept: PHYSICAL THERAPY | Facility: OTHER | Age: 85
End: 2025-08-06
Attending: FAMILY MEDICINE
Payer: MEDICARE

## 2025-08-06 DIAGNOSIS — M54.2 CHRONIC NECK PAIN: ICD-10-CM

## 2025-08-06 DIAGNOSIS — M62.838 TRAPEZIUS MUSCLE SPASM: Primary | ICD-10-CM

## 2025-08-06 DIAGNOSIS — G89.29 CHRONIC NECK PAIN: ICD-10-CM

## 2025-08-13 ENCOUNTER — THERAPY VISIT (OUTPATIENT)
Dept: PHYSICAL THERAPY | Facility: OTHER | Age: 85
End: 2025-08-13
Attending: FAMILY MEDICINE
Payer: MEDICARE

## 2025-08-13 DIAGNOSIS — M54.2 CHRONIC NECK PAIN: ICD-10-CM

## 2025-08-13 DIAGNOSIS — M62.838 TRAPEZIUS MUSCLE SPASM: Primary | ICD-10-CM

## 2025-08-13 DIAGNOSIS — G89.29 CHRONIC NECK PAIN: ICD-10-CM

## 2025-08-20 ENCOUNTER — THERAPY VISIT (OUTPATIENT)
Dept: PHYSICAL THERAPY | Facility: OTHER | Age: 85
End: 2025-08-20
Attending: FAMILY MEDICINE
Payer: MEDICARE

## 2025-08-20 DIAGNOSIS — G89.29 CHRONIC NECK PAIN: ICD-10-CM

## 2025-08-20 DIAGNOSIS — M54.2 CHRONIC NECK PAIN: ICD-10-CM

## 2025-08-20 DIAGNOSIS — M62.838 TRAPEZIUS MUSCLE SPASM: Primary | ICD-10-CM

## 2025-08-27 ENCOUNTER — THERAPY VISIT (OUTPATIENT)
Dept: PHYSICAL THERAPY | Facility: OTHER | Age: 85
End: 2025-08-27
Attending: FAMILY MEDICINE
Payer: MEDICARE

## 2025-08-27 DIAGNOSIS — G89.29 CHRONIC NECK PAIN: ICD-10-CM

## 2025-08-27 DIAGNOSIS — M62.838 TRAPEZIUS MUSCLE SPASM: Primary | ICD-10-CM

## 2025-08-27 DIAGNOSIS — M54.2 CHRONIC NECK PAIN: ICD-10-CM

## 2025-09-03 ENCOUNTER — THERAPY VISIT (OUTPATIENT)
Dept: PHYSICAL THERAPY | Facility: OTHER | Age: 85
End: 2025-09-03
Attending: FAMILY MEDICINE
Payer: MEDICARE

## 2025-09-03 DIAGNOSIS — G89.29 CHRONIC NECK PAIN: ICD-10-CM

## 2025-09-03 DIAGNOSIS — M54.2 CHRONIC NECK PAIN: ICD-10-CM

## 2025-09-03 DIAGNOSIS — M62.838 TRAPEZIUS MUSCLE SPASM: Primary | ICD-10-CM

## (undated) RX ORDER — LIDOCAINE HYDROCHLORIDE 20 MG/ML
SOLUTION OROPHARYNGEAL
Status: DISPENSED
Start: 2024-08-06

## (undated) RX ORDER — FENTANYL CITRATE 50 UG/ML
INJECTION, SOLUTION INTRAMUSCULAR; INTRAVENOUS
Status: DISPENSED
Start: 2024-08-06